# Patient Record
Sex: MALE | Race: WHITE | NOT HISPANIC OR LATINO | Employment: FULL TIME | URBAN - METROPOLITAN AREA
[De-identification: names, ages, dates, MRNs, and addresses within clinical notes are randomized per-mention and may not be internally consistent; named-entity substitution may affect disease eponyms.]

---

## 2017-08-23 ENCOUNTER — HOSPITAL ENCOUNTER (OUTPATIENT)
Facility: MEDICAL CENTER | Age: 42
End: 2017-08-23
Attending: FAMILY MEDICINE
Payer: COMMERCIAL

## 2017-08-23 ENCOUNTER — OFFICE VISIT (OUTPATIENT)
Dept: URGENT CARE | Facility: PHYSICIAN GROUP | Age: 42
End: 2017-08-23
Payer: COMMERCIAL

## 2017-08-23 VITALS
SYSTOLIC BLOOD PRESSURE: 112 MMHG | TEMPERATURE: 99 F | RESPIRATION RATE: 14 BRPM | HEART RATE: 68 BPM | HEIGHT: 72 IN | WEIGHT: 269 LBS | OXYGEN SATURATION: 98 % | DIASTOLIC BLOOD PRESSURE: 82 MMHG | BODY MASS INDEX: 36.44 KG/M2

## 2017-08-23 DIAGNOSIS — E66.9 OBESITY (BMI 35.0-39.9 WITHOUT COMORBIDITY): ICD-10-CM

## 2017-08-23 DIAGNOSIS — M79.672 LEFT FOOT PAIN: ICD-10-CM

## 2017-08-23 LAB
ALBUMIN SERPL BCP-MCNC: 4.1 G/DL (ref 3.2–4.9)
ALBUMIN/GLOB SERPL: 1.2 G/DL
ALP SERPL-CCNC: 65 U/L (ref 30–99)
ALT SERPL-CCNC: 22 U/L (ref 2–50)
ANION GAP SERPL CALC-SCNC: 8 MMOL/L (ref 0–11.9)
AST SERPL-CCNC: 21 U/L (ref 12–45)
BILIRUB SERPL-MCNC: 0.8 MG/DL (ref 0.1–1.5)
BUN SERPL-MCNC: 13 MG/DL (ref 8–22)
CALCIUM SERPL-MCNC: 9.5 MG/DL (ref 8.5–10.5)
CHLORIDE SERPL-SCNC: 104 MMOL/L (ref 96–112)
CO2 SERPL-SCNC: 26 MMOL/L (ref 20–33)
CREAT SERPL-MCNC: 1.04 MG/DL (ref 0.5–1.4)
GFR SERPL CREATININE-BSD FRML MDRD: >60 ML/MIN/1.73 M 2
GLOBULIN SER CALC-MCNC: 3.4 G/DL (ref 1.9–3.5)
GLUCOSE SERPL-MCNC: 91 MG/DL (ref 65–99)
POTASSIUM SERPL-SCNC: 4.3 MMOL/L (ref 3.6–5.5)
PROT SERPL-MCNC: 7.5 G/DL (ref 6–8.2)
SODIUM SERPL-SCNC: 138 MMOL/L (ref 135–145)
URATE SERPL-MCNC: 6.9 MG/DL (ref 2.5–8.3)

## 2017-08-23 PROCEDURE — 36415 COLL VENOUS BLD VENIPUNCTURE: CPT | Performed by: FAMILY MEDICINE

## 2017-08-23 PROCEDURE — 99214 OFFICE O/P EST MOD 30 MIN: CPT | Performed by: FAMILY MEDICINE

## 2017-08-23 PROCEDURE — 80053 COMPREHEN METABOLIC PANEL: CPT

## 2017-08-23 PROCEDURE — 84550 ASSAY OF BLOOD/URIC ACID: CPT

## 2017-08-23 RX ORDER — PREDNISONE 20 MG/1
TABLET ORAL
Qty: 9 TAB | Refills: 0 | Status: SHIPPED | OUTPATIENT
Start: 2017-08-23 | End: 2019-01-08

## 2017-08-23 NOTE — PROGRESS NOTES
Chief Complaint:    Chief Complaint   Patient presents with   • Foot Problem     BL foot pain x 1 month       History of Present Illness:    This is a new problem. Main problem is persisting discomfort in left 1st MTP joint region. This started 10 days ago for no particular reason. Pain was severe at one time, 10/10 severity. Now pain is 2/10 severity but is persisting despite using Ibuprofen which may have helped some. Wonders if could be gout. Does not have any history of gout. Also wonders about Diabetes. Before this problem, he had stubbed his right 1st toe, but now this feels back to normal and he is not concerned about right foot anymore.      Review of Systems:    Constitutional: Negative for fever, chills, and diaphoresis.   Eyes: Negative for change in vision, photophobia, pain, redness, and discharge.  ENT: Negative for ear pain, ear discharge, hearing loss, tinnitus, nasal congestion, nosebleeds, and sore throat.    Respiratory: Negative for cough, hemoptysis, sputum production, shortness of breath, wheezing, and stridor.    Cardiovascular: Negative for chest pain, palpitations, orthopnea, claudication, leg swelling, and PND.   Gastrointestinal: Negative for abdominal pain, nausea, vomiting, diarrhea, constipation, blood in stool, and melena.   Genitourinary: Negative for dysuria, urinary urgency, urinary frequency, hematuria, and flank pain.   Musculoskeletal: See HPI.  Skin: Negative for rash and itching.   Neurological: Negative for dizziness, tingling, tremors, sensory change, speech change, focal weakness, seizures, loss of consciousness, and headaches.   Endo: Negative for polydipsia.   Heme: Does not bruise/bleed easily.   Psychiatric/Behavioral: Negative for depression, suicidal ideas, hallucinations, memory loss and substance abuse. The patient is not nervous/anxious and does not have insomnia.      Past Medical History:    Past Medical History   Diagnosis Date   • Allergy        Past Surgical  "History:    History reviewed. No pertinent past surgical history.    Social History:    Social History     Social History   • Marital Status:      Spouse Name: N/A   • Number of Children: N/A   • Years of Education: N/A     Social History Main Topics   • Smoking status: Never Smoker    • Smokeless tobacco: Current User     Types: Chew   • Alcohol Use: No   • Drug Use: No   • Sexual Activity:     Partners: Female     Other Topics Concern   • None     Social History Narrative       Family History:    History reviewed. No pertinent family history.    Medications:    No current outpatient prescriptions on file prior to visit.     No current facility-administered medications on file prior to visit.       Allergies:    Allergies   Allergen Reactions   • Pcn [Penicillins]      Child kebede       Vitals:    Filed Vitals:    08/23/17 1026   BP: 112/82   Pulse: 68   Temp: 37.2 °C (99 °F)   Resp: 14   Height: 1.829 m (6' 0.01\")   Weight: 122.018 kg (269 lb)   SpO2: 98%       Physical Exam:    Constitutional: Vital signs reviewed. Appears well-developed and well-nourished. No acute distress.   Eyes: Sclera white, conjunctivae clear.  ENT: External ears normal. Hearing normal.  Cardiovascular: Peripheral pulses 2+.   Pulmonary/Chest: Respirations non-labored.  Musculoskeletal: Left 1st MTP joint region: mild erythema and soft tissue swelling with mild tenderness to palpation. No tenderness to palpation right foot.   Neurological: Alert and oriented to person, place, and time. Muscle tone normal. Coordination normal. Light touch and sensation normal.  Skin: No rashes or lesions. Warm, dry, normal turgor.  Psychiatric: Normal mood and affect. Behavior is normal. Judgment and thought content normal.     Diagnostics:     COMP METABOLIC PANEL (Order #[id number]) on 8/23/17       Component Results      Component Value Ref Range & Units Status     Sodium 138 135 - 145 mmol/L Final     Potassium 4.3 3.6 - 5.5 mmol/L Final     " Chloride 104 96 - 112 mmol/L Final     Co2 26 20 - 33 mmol/L Final     Anion Gap 8.0 0.0 - 11.9 Final     Glucose 91 65 - 99 mg/dL Final     Bun 13 8 - 22 mg/dL Final     Creatinine 1.04 0.50 - 1.40 mg/dL Final     Calcium 9.5 8.5 - 10.5 mg/dL Final     AST(SGOT) 21 12 - 45 U/L Final     ALT(SGPT) 22 2 - 50 U/L Final     Alkaline Phosphatase 65 30 - 99 U/L Final     Total Bilirubin 0.8 0.1 - 1.5 mg/dL Final     Albumin 4.1 3.2 - 4.9 g/dL Final     Total Protein 7.5 6.0 - 8.2 g/dL Final     Globulin 3.4 1.9 - 3.5 g/dL Final     A-G Ratio 1.2 g/dL Final      ESTIMATED GFR (Order #[id number]) on 8/23/17            ESTIMATED GFR (Order [id number]) - Reflex for Order [id number]         Component Results      Component Value Ref Range & Units Status     GFR If African American >60 >60 mL/min/1.73 m 2 Final     GFR If Non African American >60 >60 mL/min/1.73 m 2 Final     URIC ACID (Order #[id number]) on 8/23/17            URIC ACID (Order [id number])         Component Results      Component Value Ref Range & Units Status     Uric Acid 6.9 2.5 - 8.3 mg/dL Final       Assessment / Plan:    1. Left foot pain  - URIC ACID; Future  - predniSONE (DELTASONE) 20 MG Tab; 2 TABS ONCE A DAY ON DAYS 1-3, 1 TAB ONCE A DAY ON DAYS 4-6. TAKE WITH FOOD.  Dispense: 9 Tab; Refill: 0  - COMP METABOLIC PANEL; Future    2. Obesity (BMI 35.0-39.9 without comorbidity) ([facility])  - Patient identified as having weight management issue.  Appropriate orders and counseling given.      Discussed with him DDX and management options.    Agreeable to medication prescribed for anti-inflammatory effect and check labs ordered.    Spoke to him and discussed lab results (normal CMP and uric acid).    Follow-up with PCP or urgent care if getting worse or not better with above.

## 2017-08-23 NOTE — MR AVS SNAPSHOT
"        Raffaele Hall   2017 10:15 AM   Office Visit   MRN: [id number]    Department:  [facility]   Dept Phone:  [phone]    Description:  Male : 1975   Provider:  Alban Barry M.D.           Reason for Visit     Foot Problem BL foot pain x 1 month      Allergies as of 2017     Allergen Noted Reactions    Pcn [Penicillins] 10/23/2009       Child kebede      You were diagnosed with     Left foot pain   [[id number]]         Vital Signs     Blood Pressure Pulse Temperature Respirations Height Weight    112/82 mmHg 68 37.2 °C (99 °F) 14 1.829 m (6' 0.01\") 122.018 kg (269 lb)    Body Mass Index Oxygen Saturation Smoking Status             36.48 kg/m2 98% Never Smoker          Basic Information     Date Of Birth Sex Race Ethnicity Preferred Language    1975 Male White Non- English      Health Maintenance        Date Due Completion Dates    IMM INFLUENZA (1) 2017 ---    IMM DTaP/Tdap/Td Vaccine (2 - Td) 10/23/2019 10/23/2009            Current Immunizations     Tdap Vaccine 10/23/2009      Below and/or attached are the medications your provider expects you to take. Review all of your home medications and newly ordered medications with your provider and/or pharmacist. Follow medication instructions as directed by your provider and/or pharmacist. Please keep your medication list with you and share with your provider. Update the information when medications are discontinued, doses are changed, or new medications (including over-the-counter products) are added; and carry medication information at all times in the event of emergency situations     Allergies:  PCN - (reactions not documented)               Medications  Valid as of: 2017 - 10:55 AM    Generic Name Brand Name Tablet Size Instructions for use    PredniSONE (Tab) DELTASONE 20 MG 2 TABS ONCE A DAY ON DAYS 1-3, 1 TAB ONCE A DAY ON DAYS 4-6. TAKE WITH FOOD.        .                 Medicines prescribed today were " sent to:     [facility] [address] [place], [place] - [address] AT [facility] [address]    [address] [place] [address]    Phone: [phone] Fax: [phone]    Open 24 Hours?: No      Medication refill instructions:       If your prescription bottle indicates you have medication refills left, it is not necessary to call your provider’s office. Please contact your pharmacy and they will refill your medication.    If your prescription bottle indicates you do not have any refills left, you may request refills at any time through one of the following ways: The online [employer] system (except Urgent Care), by calling your provider’s office, or by asking your pharmacy to contact your provider’s office with a refill request. Medication refills are processed only during regular business hours and may not be available until the next business day. Your provider may request additional information or to have a follow-up visit with you prior to refilling your medication.   *Please Note: Medication refills are assigned a new Rx number when refilled electronically. Your pharmacy may indicate that no refills were authorized even though a new prescription for the same medication is available at the pharmacy. Please request the medicine by name with the pharmacy before contacting your provider for a refill.        Your To Do List     Future Labs/Procedures Complete By Expires    COMP METABOLIC PANEL  As directed 8/30/2017    URIC ACID  As directed 8/30/2017         [employer] Status: Patient Declined        Quit Tobacco Information     Do you want to quit using tobacco?    Quitting tobacco decreases risks of cancer, heart and lung disease, increases life expectancy, improves sense of taste and smell, and increases spending money, among other benefits.    If you are thinking about quitting, we can help.  • [facility] Quit Tobacco Program: 167-661-9710  o Program occurs weekly for four weeks and includes  pharmacist consultation on products to support quitting smoking or chewing tobacco. A provider referral is needed for pharmacist consultation.  • Tobacco Users Help Hotline: 5-800QUIT-NOW (039-5627) or https://[place].quitlogix.org/  o Free, confidential telephone and online coaching for [place] residents. Sessions are designed on a schedule that is convenient for you. Eligible clients receive free nicotine replacement therapy.  • Nationally: www.smokefree.gov  o Information and professional assistance to support both immediate and long-term needs as you become, and remain, a non-smoker. Smokefree.gov allows you to choose the help that best fits your needs.

## 2017-11-03 ENCOUNTER — HOSPITAL ENCOUNTER (OUTPATIENT)
Dept: RADIOLOGY | Facility: MEDICAL CENTER | Age: 42
End: 2017-11-03
Attending: FAMILY MEDICINE
Payer: COMMERCIAL

## 2017-11-03 ENCOUNTER — OFFICE VISIT (OUTPATIENT)
Dept: URGENT CARE | Facility: PHYSICIAN GROUP | Age: 42
End: 2017-11-03
Payer: COMMERCIAL

## 2017-11-03 VITALS
HEIGHT: 72 IN | WEIGHT: 265 LBS | RESPIRATION RATE: 16 BRPM | TEMPERATURE: 97.9 F | DIASTOLIC BLOOD PRESSURE: 68 MMHG | OXYGEN SATURATION: 95 % | HEART RATE: 76 BPM | BODY MASS INDEX: 35.89 KG/M2 | SYSTOLIC BLOOD PRESSURE: 126 MMHG

## 2017-11-03 DIAGNOSIS — M79.672 LEFT FOOT PAIN: ICD-10-CM

## 2017-11-03 DIAGNOSIS — M19.90 INFLAMMATORY ARTHRITIS: ICD-10-CM

## 2017-11-03 PROCEDURE — 73630 X-RAY EXAM OF FOOT: CPT | Mod: LT

## 2017-11-03 PROCEDURE — 99214 OFFICE O/P EST MOD 30 MIN: CPT | Performed by: FAMILY MEDICINE

## 2017-11-03 RX ORDER — INDOMETHACIN 50 MG/1
50 CAPSULE ORAL 3 TIMES DAILY PRN
Qty: 30 CAP | Refills: 1 | Status: SHIPPED | OUTPATIENT
Start: 2017-11-03 | End: 2019-01-08

## 2017-11-03 ASSESSMENT — ENCOUNTER SYMPTOMS
MYALGIAS: 0
WEIGHT LOSS: 0

## 2017-11-03 NOTE — PROGRESS NOTES
Subjective:      Raffaele Hall is a 42 y.o. male who presents with Foot Pain (bilateral )            6 weeks intermittent foot pain. No trauma. Suspects gout but uric acid level was normal. Pain is again severe L>R. He has been trying to pay attention to diet and thinks fish might trigger. No fever.         Pair is worse with weight bearing. No other aggravating or alleviating factors.      Review of Systems   Constitutional: Negative for malaise/fatigue and weight loss.   Musculoskeletal: Negative for joint pain and myalgias.   Skin: Negative for itching and rash.     .  Medications, Allergies, and current problem list reviewed today in Epic       Objective:     /68   Pulse 76   Temp 36.6 °C (97.9 °F)   Resp 16   Ht 1.829 m (6')   Wt 120.2 kg (265 lb)   SpO2 95%   BMI 35.94 kg/m²      Physical Exam   Constitutional: He appears well-developed and well-nourished. No distress.   Musculoskeletal:        Feet:    Left foot:    Neurological:   Speech is clear. Patient is appropriate and cooperative.     Skin: Skin is warm and dry. No rash noted.               Assessment/Plan:   Xray: no fracture or dislocation by my read. Radiology review pending.    1. Left foot pain  DX-FOOT-COMPLETE 3+ LEFT    REFERRAL TO PODIATRY    indomethacin (INDOCIN) 50 MG Cap   2. Inflammatory arthritis       Differential diagnosis, natural history, supportive care, and indications for immediate follow-up discussed at length.   Suspect gout

## 2019-01-03 ENCOUNTER — HOSPITAL ENCOUNTER (OUTPATIENT)
Dept: RADIOLOGY | Facility: MEDICAL CENTER | Age: 44
End: 2019-01-03
Attending: PHYSICIAN ASSISTANT
Payer: COMMERCIAL

## 2019-01-03 ENCOUNTER — OFFICE VISIT (OUTPATIENT)
Dept: URGENT CARE | Facility: PHYSICIAN GROUP | Age: 44
End: 2019-01-03
Payer: COMMERCIAL

## 2019-01-03 VITALS
BODY MASS INDEX: 34.58 KG/M2 | SYSTOLIC BLOOD PRESSURE: 120 MMHG | RESPIRATION RATE: 20 BRPM | WEIGHT: 255 LBS | OXYGEN SATURATION: 95 % | DIASTOLIC BLOOD PRESSURE: 82 MMHG | TEMPERATURE: 97 F | HEART RATE: 110 BPM

## 2019-01-03 DIAGNOSIS — R05.9 COUGH: ICD-10-CM

## 2019-01-03 DIAGNOSIS — J98.8 RESPIRATORY INFECTION: ICD-10-CM

## 2019-01-03 PROCEDURE — 94640 AIRWAY INHALATION TREATMENT: CPT | Performed by: PHYSICIAN ASSISTANT

## 2019-01-03 PROCEDURE — 94760 N-INVAS EAR/PLS OXIMETRY 1: CPT | Performed by: PHYSICIAN ASSISTANT

## 2019-01-03 PROCEDURE — 99214 OFFICE O/P EST MOD 30 MIN: CPT | Mod: 25 | Performed by: PHYSICIAN ASSISTANT

## 2019-01-03 PROCEDURE — 71046 X-RAY EXAM CHEST 2 VIEWS: CPT

## 2019-01-03 RX ORDER — BENZONATATE 100 MG/1
200 CAPSULE ORAL 3 TIMES DAILY PRN
Qty: 30 CAP | Refills: 0 | Status: SHIPPED | OUTPATIENT
Start: 2019-01-03 | End: 2019-01-08

## 2019-01-03 RX ORDER — ALBUTEROL SULFATE 2.5 MG/3ML
2.5 SOLUTION RESPIRATORY (INHALATION) EVERY 6 HOURS PRN
Qty: 30 BULLET | Refills: 0 | Status: SHIPPED | OUTPATIENT
Start: 2019-01-03 | End: 2022-05-03 | Stop reason: SDUPTHER

## 2019-01-03 RX ORDER — ALBUTEROL SULFATE 2.5 MG/3ML
2.5 SOLUTION RESPIRATORY (INHALATION) ONCE
Status: COMPLETED | OUTPATIENT
Start: 2019-01-03 | End: 2019-01-03

## 2019-01-03 RX ORDER — AZITHROMYCIN 250 MG/1
TABLET, FILM COATED ORAL
Qty: 6 TAB | Refills: 0 | Status: SHIPPED | OUTPATIENT
Start: 2019-01-03 | End: 2019-01-08

## 2019-01-03 RX ADMIN — ALBUTEROL SULFATE 2.5 MG: 2.5 SOLUTION RESPIRATORY (INHALATION) at 11:38

## 2019-01-03 ASSESSMENT — ENCOUNTER SYMPTOMS
SORE THROAT: 1
COUGH: 1
DIARRHEA: 0
EYE DISCHARGE: 0
SPUTUM PRODUCTION: 1
RHINORRHEA: 1
HEADACHES: 0
CHILLS: 1
FEVER: 0
WHEEZING: 1
TINGLING: 0
DIZZINESS: 0
VOMITING: 0
SHORTNESS OF BREATH: 1
NECK PAIN: 0
EYE REDNESS: 0
MYALGIAS: 1

## 2019-01-04 NOTE — PROGRESS NOTES
Subjective:      Raffaele Hall is a 43 y.o. male who presents with Cough (x9 days, low energy, chest congestion, HA)            Cough   This is a new problem. Episode onset: 9-10 days ago. The problem has been waxing and waning. The problem occurs every few minutes. The cough is productive of sputum. Associated symptoms include chills, myalgias, nasal congestion, postnasal drip, rhinorrhea, a sore throat, shortness of breath and wheezing. Pertinent negatives include no chest pain, ear congestion, ear pain, eye redness, fever, headaches or rash. Associated symptoms comments: Fever- resolved.   . Nothing aggravates the symptoms. He has tried OTC cough suppressant for the symptoms. The treatment provided mild relief. His past medical history is significant for bronchitis and pneumonia. There is no history of asthma.       Review of Systems   Constitutional: Positive for chills and malaise/fatigue. Negative for fever.   HENT: Positive for congestion, postnasal drip, rhinorrhea and sore throat. Negative for ear discharge and ear pain.    Eyes: Negative for discharge and redness.   Respiratory: Positive for cough, sputum production, shortness of breath and wheezing.    Cardiovascular: Negative for chest pain.   Gastrointestinal: Negative for diarrhea and vomiting.   Genitourinary: Negative for dysuria and urgency.   Musculoskeletal: Positive for myalgias. Negative for neck pain.   Skin: Negative for itching and rash.   Neurological: Negative for dizziness, tingling and headaches.   All other systems reviewed and are negative.         Objective:     /82   Pulse (!) 110   Temp 36.1 °C (97 °F)   Resp 20   Wt 115.7 kg (255 lb)   SpO2 95%   BMI 34.58 kg/m²    PMH:  has a past medical history of Allergy.  MEDS:   Current Outpatient Prescriptions:   •  albuterol (PROVENTIL) 2.5mg/3ml Nebu Soln solution for nebulization, 3 mL by Nebulization route every 6 hours as needed for Shortness of Breath., Disp: 30 Bullet, Rfl:  0  •  azithromycin (ZITHROMAX) 250 MG Tab, Take 2 tabs today, then 1 tab daily til completed., Disp: 6 Tab, Rfl: 0  •  benzonatate (TESSALON) 100 MG Cap, Take 2 Caps by mouth 3 times a day as needed for Cough., Disp: 30 Cap, Rfl: 0  •  indomethacin (INDOCIN) 50 MG Cap, Take 1 Cap by mouth 3 times a day as needed. (Patient not taking: Reported on 1/3/2019), Disp: 30 Cap, Rfl: 1  •  predniSONE (DELTASONE) 20 MG Tab, 2 TABS ONCE A DAY ON DAYS 1-3, 1 TAB ONCE A DAY ON DAYS 4-6. TAKE WITH FOOD. (Patient not taking: Reported on 1/3/2019), Disp: 9 Tab, Rfl: 0  ALLERGIES:   Allergies   Allergen Reactions   • Pcn [Penicillins]      Child kebede     SURGHX: No past surgical history on file.  SOCHX:  reports that he has never smoked. His smokeless tobacco use includes Chew. He reports that he does not drink alcohol or use drugs.  FH: Family history was reviewed, no pertinent findings to report    Physical Exam   Constitutional: He is oriented to person, place, and time. He appears well-developed and well-nourished.   HENT:   Head: Normocephalic and atraumatic.   Mouth/Throat: No oropharyngeal exudate.   Ears- Canals clear- TM- with clear fluid effusions bilaterally.   Pos. PND, with slight erythema- without tonsillar edema or exudate.   Mild discharge noted bilaterally- to nares.      Eyes: Pupils are equal, round, and reactive to light. EOM are normal.   Neck: Normal range of motion. Neck supple.   Cardiovascular: Normal rate and regular rhythm.    No murmur heard.  Pulmonary/Chest: Effort normal. No respiratory distress. He has wheezes.   Musculoskeletal: Normal range of motion. He exhibits no tenderness.   Lymphadenopathy:     He has no cervical adenopathy.   Neurological: He is alert and oriented to person, place, and time.   Skin: Skin is warm. No rash noted.   Psychiatric: He has a normal mood and affect. His behavior is normal.   Vitals reviewed.              Assessment/Plan:     1. Respiratory infection  - albuterol  (PROVENTIL) 2.5mg/3ml Nebu Soln solution for nebulization; 3 mL by Nebulization route every 6 hours as needed for Shortness of Breath.  Dispense: 30 Bullet; Refill: 0  - azithromycin (ZITHROMAX) 250 MG Tab; Take 2 tabs today, then 1 tab daily til completed.  Dispense: 6 Tab; Refill: 0  - benzonatate (TESSALON) 100 MG Cap; Take 2 Caps by mouth 3 times a day as needed for Cough.  Dispense: 30 Cap; Refill: 0    2. Cough  - DX-CHEST-2 VIEWS; Future  - albuterol (PROVENTIL) 2.5mg/3ml nebulizer solution 2.5 mg; 3 mL by Nebulization route Once.  - albuterol (PROVENTIL) 2.5mg/3ml Nebu Soln solution for nebulization; 3 mL by Nebulization route every 6 hours as needed for Shortness of Breath.  Dispense: 30 Bullet; Refill: 0  - azithromycin (ZITHROMAX) 250 MG Tab; Take 2 tabs today, then 1 tab daily til completed.  Dispense: 6 Tab; Refill: 0  - benzonatate (TESSALON) 100 MG Cap; Take 2 Caps by mouth 3 times a day as needed for Cough.  Dispense: 30 Cap; Refill: 0    Noted subjective improvement after breathing treatment today-pulse ox rechecked by myself of which was 96%.  CXR: no acute cardiopulmonary process-reviewed the chest x-ray and agree with the official read.  Due to patient's history we will start the patient on above.  We will also provide refill on his albuterol for his home nebulizer.     Encouraged OTC supportive meds PRN. Humidification, increase fluids, avoid night time dairy.   Patient given precautionary s/sx that mandate immediate follow up and evaluation in the ED. Advised of risks of not doing so.    DDX, Supportive care, and indications for immediate follow-up discussed with patient.    Instructed to return to clinic or nearest emergency department if we are not available for any change in condition, further concerns, or worsening of symptoms.    The patient demonstrated a good understanding and agreed with the treatment plan.

## 2019-01-08 ENCOUNTER — OFFICE VISIT (OUTPATIENT)
Dept: URGENT CARE | Facility: PHYSICIAN GROUP | Age: 44
End: 2019-01-08
Payer: COMMERCIAL

## 2019-01-08 VITALS
WEIGHT: 263 LBS | RESPIRATION RATE: 20 BRPM | DIASTOLIC BLOOD PRESSURE: 82 MMHG | HEART RATE: 122 BPM | OXYGEN SATURATION: 97 % | SYSTOLIC BLOOD PRESSURE: 126 MMHG | TEMPERATURE: 98 F | BODY MASS INDEX: 35.67 KG/M2

## 2019-01-08 DIAGNOSIS — J98.01 BRONCHOSPASM: Primary | ICD-10-CM

## 2019-01-08 DIAGNOSIS — J01.10 ACUTE NON-RECURRENT FRONTAL SINUSITIS: ICD-10-CM

## 2019-01-08 LAB
FLUAV+FLUBV AG SPEC QL IA: NORMAL
INT CON NEG: NEGATIVE
INT CON POS: POSITIVE

## 2019-01-08 PROCEDURE — 99214 OFFICE O/P EST MOD 30 MIN: CPT | Performed by: PHYSICIAN ASSISTANT

## 2019-01-08 PROCEDURE — 87804 INFLUENZA ASSAY W/OPTIC: CPT | Performed by: PHYSICIAN ASSISTANT

## 2019-01-08 RX ORDER — METHYLPREDNISOLONE 4 MG/1
4 TABLET ORAL DAILY
Qty: 1 KIT | Refills: 0 | Status: SHIPPED | OUTPATIENT
Start: 2019-01-08 | End: 2019-01-14

## 2019-01-08 RX ORDER — FLUTICASONE PROPIONATE 50 MCG
1 SPRAY, SUSPENSION (ML) NASAL DAILY
Qty: 16 G | Refills: 0 | Status: SHIPPED | OUTPATIENT
Start: 2019-01-08 | End: 2019-01-15

## 2019-01-08 ASSESSMENT — ENCOUNTER SYMPTOMS
SORE THROAT: 0
CHILLS: 0
MYALGIAS: 1
COUGH: 1
SHORTNESS OF BREATH: 0
VERTIGO: 1
NAUSEA: 1
CHANGE IN BOWEL HABIT: 0
CONSTIPATION: 0
VOMITING: 0
ABDOMINAL PAIN: 0
FEVER: 0
FATIGUE: 1
WHEEZING: 0
SINUS PAIN: 0
DIARRHEA: 0

## 2019-01-08 NOTE — LETTER
January 8, 2019         Patient: Raffaele Hall   YOB: 1975   Date of Visit: 1/8/2019           To Whom it May Concern:    Raffaele Hall was seen in my clinic on 1/8/2019. He may return to work on 1/10/18.    If you have any questions or concerns, please don't hesitate to call.        Sincerely,           Charlene Beltran P.A.-C.  Electronically Signed

## 2019-01-08 NOTE — PATIENT INSTRUCTIONS
Bronchospasm, Adult  A bronchospasm is when the tubes that carry air in and out of your lungs (airways) spasm or tighten. During a bronchospasm it is hard to breathe. This is because the airways get smaller. A bronchospasm can be triggered by:  · Allergies. These may be to animals, pollen, food, or mold.  · Infection. This is a common cause of bronchospasm.  · Exercise.  · Irritants. These include pollution, cigarette smoke, strong odors, aerosol sprays, and paint fumes.  · Weather changes.  · Stress.  · Being emotional.  Follow these instructions at home:  · Always have a plan for getting help. Know when to call your doctor and local emergency services (911 in the [place] Know where you can get emergency care.  · Only take medicines as told by your doctor.  · If you were prescribed an inhaler or nebulizer machine, ask your doctor how to use it correctly. Always use a spacer with your inhaler if you were given one.  · Stay calm during an attack. Try to relax and breathe more slowly.  · Control your home environment:  ¨ Change your heating and air conditioning filter at least once a month.  ¨ Limit your use of fireplaces and wood stoves.  ¨ Do not  smoke. Do not  allow smoking in your home.  ¨ Avoid perfumes and fragrances.  ¨ Get rid of pests (such as roaches and mice) and their droppings.  ¨ Throw away plants if you see mold on them.  ¨ Keep your house clean and dust free.  ¨ Replace carpet with wood, tile, or vinyl bryanna. Carpet can trap dander and dust.  ¨ Use allergy-proof pillows, mattress covers, and box spring covers.  ¨ Wash bed sheets and blankets every week in hot water. Dry them in a dryer.  ¨ Use blankets that are made of polyester or cotton.  ¨ Wash hands frequently.  Contact a doctor if:  · You have muscle aches.  · You have chest pain.  · The thick spit you spit or cough up (sputum) changes from clear or white to yellow, green, gray, or bloody.  · The thick spit you spit or cough up gets  thicker.  · There are problems that may be related to the medicine you are given such as:  ¨ A rash.  ¨ Itching.  ¨ Swelling.  ¨ Trouble breathing.  Get help right away if:  · You feel you cannot breathe or catch your breath.  · You cannot stop coughing.  · Your treatment is not helping you breathe better.  · You have very bad chest pain.  This information is not intended to replace advice given to you by your health care provider. Make sure you discuss any questions you have with your health care provider.  Document Released: 10/15/2010 Document Revised: 05/25/2017 Document Reviewed: 06/10/2014  [employer] Interactive Patient Education © 2017 [employer]

## 2019-01-08 NOTE — PROGRESS NOTES
Subjective:   Raffaele Hall is a 43 y.o. male who presents for Cough (x1 week, sinuses seemed to be draining into ears and makes pt dizzy )        Ear Fullness   This is a new problem. Episode onset: 4 days. The problem occurs constantly. The problem has been unchanged. Associated symptoms include coughing, fatigue, myalgias, nausea and vertigo. Pertinent negatives include no abdominal pain, change in bowel habit, chills, congestion, fever, sore throat or vomiting. Nothing aggravates the symptoms. Treatments tried: Robitussin.     Review of Systems   Constitutional: Positive for fatigue. Negative for chills, fever and malaise/fatigue.   HENT: Negative for congestion, sinus pain and sore throat.    Respiratory: Positive for cough. Negative for shortness of breath and wheezing.    Gastrointestinal: Positive for nausea. Negative for abdominal pain, change in bowel habit, constipation, diarrhea and vomiting.   Musculoskeletal: Positive for myalgias.   Neurological: Positive for vertigo.   All other systems reviewed and are negative.      PMH:  has a past medical history of Allergy.  MEDS:   Current Outpatient Prescriptions:   •  MethylPREDNISolone (MEDROL DOSEPAK) 4 MG Tablet Therapy Pack, Take 1 Tab by mouth every day for 6 days. Take as tapered-dosage schedule, Disp: 1 Kit, Rfl: 0  •  fluticasone (FLONASE) 50 MCG/ACT nasal spray, Spray 1 Spray in nose every day for 7 days., Disp: 16 g, Rfl: 0  •  albuterol (PROVENTIL) 2.5mg/3ml Nebu Soln solution for nebulization, 3 mL by Nebulization route every 6 hours as needed for Shortness of Breath., Disp: 30 Bullet, Rfl: 0  ALLERGIES:   Allergies   Allergen Reactions   • Pcn [Penicillins]      Child kebede     SURGHX: History reviewed. No pertinent surgical history.  SOCHX:  reports that he has never smoked. His smokeless tobacco use includes Chew. He reports that he does not drink alcohol or use drugs.  History reviewed. No pertinent family history.     Objective:   /82    Pulse (!) 122   Temp 36.7 °C (98 °F)   Resp 20   Wt 119.3 kg (263 lb)   SpO2 97%   BMI 35.67 kg/m²     Physical Exam   Constitutional: He is oriented to person, place, and time. He appears well-developed and well-nourished. No distress.   HENT:   Head: Normocephalic and atraumatic.   Right Ear: Hearing, tympanic membrane and ear canal normal.   Left Ear: Hearing, tympanic membrane and ear canal normal.   Nose: Mucosal edema and rhinorrhea present. No sinus tenderness.   Mouth/Throat: Posterior oropharyngeal erythema present. No oropharyngeal exudate or posterior oropharyngeal edema.   Eyes: Pupils are equal, round, and reactive to light. Conjunctivae are normal.   Neck: Normal range of motion. Neck supple.   Cardiovascular: Normal rate and regular rhythm.    Pulmonary/Chest: Effort normal and breath sounds normal.   Lymphadenopathy:     He has cervical adenopathy.   Neurological: He is alert and oriented to person, place, and time.   Skin: Skin is warm and dry.   Psychiatric: He has a normal mood and affect. His behavior is normal.   Vitals reviewed.    Influenza A/B: Negative      Assessment/Plan:     1. Bronchospasm  MethylPREDNISolone (MEDROL DOSEPAK) 4 MG Tablet Therapy Pack    POCT Influenza A/B   2. Acute non-recurrent frontal sinusitis  fluticasone (FLONASE) 50 MCG/ACT nasal spray     Supportive care reviewed.  Educational handout on bronchospasm provided    We discussed importance of follow-up with primary care provider within 7-10 days.  If symptoms worsen or persist patient can contact me or return to clinic for follow-up.  Red flags and strict emergency room precautions discussed.  Patient verbalizes understanding of information.

## 2021-08-02 ENCOUNTER — NON-PROVIDER VISIT (OUTPATIENT)
Dept: URGENT CARE | Facility: PHYSICIAN GROUP | Age: 46
End: 2021-08-02

## 2021-08-02 DIAGNOSIS — Z02.1 PRE-EMPLOYMENT DRUG SCREENING: ICD-10-CM

## 2021-08-02 LAB
AMP AMPHETAMINE: NORMAL
COC COCAINE: NORMAL
INT CON NEG: NORMAL
INT CON POS: NORMAL
MET METHAMPHETAMINES: NORMAL
OPI OPIATES: NORMAL
PCP PHENCYCLIDINE: NORMAL
POC DRUG COMMENT 753798-OCCUPATIONAL HEALTH: NEGATIVE
THC: NORMAL

## 2021-08-02 PROCEDURE — 80305 DRUG TEST PRSMV DIR OPT OBS: CPT | Performed by: PHYSICIAN ASSISTANT

## 2022-04-07 ENCOUNTER — TELEPHONE (OUTPATIENT)
Dept: SCHEDULING | Facility: IMAGING CENTER | Age: 47
End: 2022-04-07
Payer: COMMERCIAL

## 2022-05-03 ENCOUNTER — RESEARCH ENCOUNTER (OUTPATIENT)
Dept: MEDICAL GROUP | Facility: PHYSICIAN GROUP | Age: 47
End: 2022-05-03

## 2022-05-03 ENCOUNTER — HOSPITAL ENCOUNTER (OUTPATIENT)
Dept: LAB | Facility: MEDICAL CENTER | Age: 47
End: 2022-05-03
Attending: PHYSICIAN ASSISTANT
Payer: COMMERCIAL

## 2022-05-03 ENCOUNTER — OFFICE VISIT (OUTPATIENT)
Dept: MEDICAL GROUP | Facility: PHYSICIAN GROUP | Age: 47
End: 2022-05-03
Payer: COMMERCIAL

## 2022-05-03 VITALS
RESPIRATION RATE: 18 BRPM | HEART RATE: 99 BPM | BODY MASS INDEX: 36.7 KG/M2 | OXYGEN SATURATION: 96 % | SYSTOLIC BLOOD PRESSURE: 104 MMHG | TEMPERATURE: 97.3 F | DIASTOLIC BLOOD PRESSURE: 78 MMHG | WEIGHT: 271 LBS | HEIGHT: 72 IN

## 2022-05-03 DIAGNOSIS — Z00.6 RESEARCH STUDY PATIENT: ICD-10-CM

## 2022-05-03 DIAGNOSIS — Z13.71 ENCOUNTER FOR NONPROCREATIVE SCREENING FOR GENETIC DISEASE CARRIER STATUS: ICD-10-CM

## 2022-05-03 DIAGNOSIS — E66.9 OBESITY (BMI 35.0-39.9 WITHOUT COMORBIDITY): ICD-10-CM

## 2022-05-03 DIAGNOSIS — M79.675 PAIN OF TOE OF LEFT FOOT: ICD-10-CM

## 2022-05-03 DIAGNOSIS — G25.0 ESSENTIAL TREMOR: ICD-10-CM

## 2022-05-03 DIAGNOSIS — R06.83 SNORING: ICD-10-CM

## 2022-05-03 DIAGNOSIS — R05.9 COUGH: ICD-10-CM

## 2022-05-03 DIAGNOSIS — Z23 NEED FOR VACCINATION: ICD-10-CM

## 2022-05-03 PROBLEM — M21.41 FLAT FEET: Status: ACTIVE | Noted: 2022-05-03

## 2022-05-03 PROBLEM — M21.42 FLAT FEET: Status: ACTIVE | Noted: 2022-05-03

## 2022-05-03 PROBLEM — M79.676 TOE PAIN: Status: ACTIVE | Noted: 2022-05-03

## 2022-05-03 LAB
ALBUMIN SERPL BCP-MCNC: 4.4 G/DL (ref 3.2–4.9)
ALBUMIN/GLOB SERPL: 1.3 G/DL
ALP SERPL-CCNC: 79 U/L (ref 30–99)
ALT SERPL-CCNC: 20 U/L (ref 2–50)
ANION GAP SERPL CALC-SCNC: 9 MMOL/L (ref 7–16)
AST SERPL-CCNC: 20 U/L (ref 12–45)
BILIRUB SERPL-MCNC: 0.7 MG/DL (ref 0.1–1.5)
BUN SERPL-MCNC: 17 MG/DL (ref 8–22)
CALCIUM SERPL-MCNC: 9.5 MG/DL (ref 8.5–10.5)
CHLORIDE SERPL-SCNC: 104 MMOL/L (ref 96–112)
CHOLEST SERPL-MCNC: 195 MG/DL (ref 100–199)
CO2 SERPL-SCNC: 26 MMOL/L (ref 20–33)
CREAT SERPL-MCNC: 1.24 MG/DL (ref 0.5–1.4)
ERYTHROCYTE [DISTWIDTH] IN BLOOD BY AUTOMATED COUNT: 38.8 FL (ref 35.9–50)
EST. AVERAGE GLUCOSE BLD GHB EST-MCNC: 103 MG/DL
FASTING STATUS PATIENT QL REPORTED: NORMAL
GFR SERPLBLD CREATININE-BSD FMLA CKD-EPI: 72 ML/MIN/1.73 M 2
GLOBULIN SER CALC-MCNC: 3.3 G/DL (ref 1.9–3.5)
GLUCOSE SERPL-MCNC: 100 MG/DL (ref 65–99)
HBA1C MFR BLD: 5.2 % (ref 4–5.6)
HCT VFR BLD AUTO: 48.3 % (ref 42–52)
HDLC SERPL-MCNC: 38 MG/DL
HGB BLD-MCNC: 16.1 G/DL (ref 14–18)
LDLC SERPL CALC-MCNC: 144 MG/DL
MCH RBC QN AUTO: 29.1 PG (ref 27–33)
MCHC RBC AUTO-ENTMCNC: 33.3 G/DL (ref 33.7–35.3)
MCV RBC AUTO: 87.3 FL (ref 81.4–97.8)
PLATELET # BLD AUTO: 215 K/UL (ref 164–446)
PMV BLD AUTO: 10.2 FL (ref 9–12.9)
POTASSIUM SERPL-SCNC: 4.6 MMOL/L (ref 3.6–5.5)
PROT SERPL-MCNC: 7.7 G/DL (ref 6–8.2)
RBC # BLD AUTO: 5.53 M/UL (ref 4.7–6.1)
SODIUM SERPL-SCNC: 139 MMOL/L (ref 135–145)
TRIGL SERPL-MCNC: 67 MG/DL (ref 0–149)
TSH SERPL DL<=0.005 MIU/L-ACNC: 2.48 UIU/ML (ref 0.38–5.33)
WBC # BLD AUTO: 5.7 K/UL (ref 4.8–10.8)

## 2022-05-03 PROCEDURE — 90471 IMMUNIZATION ADMIN: CPT | Performed by: PHYSICIAN ASSISTANT

## 2022-05-03 PROCEDURE — 83036 HEMOGLOBIN GLYCOSYLATED A1C: CPT

## 2022-05-03 PROCEDURE — 80053 COMPREHEN METABOLIC PANEL: CPT

## 2022-05-03 PROCEDURE — 36415 COLL VENOUS BLD VENIPUNCTURE: CPT

## 2022-05-03 PROCEDURE — 85027 COMPLETE CBC AUTOMATED: CPT

## 2022-05-03 PROCEDURE — 99204 OFFICE O/P NEW MOD 45 MIN: CPT | Mod: 25 | Performed by: PHYSICIAN ASSISTANT

## 2022-05-03 PROCEDURE — 84443 ASSAY THYROID STIM HORMONE: CPT

## 2022-05-03 PROCEDURE — 90715 TDAP VACCINE 7 YRS/> IM: CPT | Performed by: PHYSICIAN ASSISTANT

## 2022-05-03 PROCEDURE — 80061 LIPID PANEL: CPT

## 2022-05-03 RX ORDER — ALBUTEROL SULFATE 2.5 MG/3ML
2.5 SOLUTION RESPIRATORY (INHALATION) EVERY 6 HOURS PRN
Qty: 30 EACH | Refills: 0 | Status: SHIPPED | OUTPATIENT
Start: 2022-05-03 | End: 2022-08-03

## 2022-05-03 ASSESSMENT — PATIENT HEALTH QUESTIONNAIRE - PHQ9: CLINICAL INTERPRETATION OF PHQ2 SCORE: 0

## 2022-05-03 NOTE — PATIENT INSTRUCTIONS
SLEEP STUDY INSTRUCTIONS    1. Our main concern is to provide the best test and evaluation of your sleep and your cooperation in following the guidelines is very necessary.    2. We have no facilities for family members or guests at the sleep center. Special arrangements will be made for children requiring overnight sleep studies.    3. Unless otherwise instructed, AVOID alcoholic beverages on the day of your sleep study.    4. DO NOT drink coffee or caffeine-containing beverages after 12:00 noon on the day of your sleep study.    5. There is NO smoking at the sleep center.    6. Try to maintain a usual daytime schedule prior to the study (avoid unusual physical activity or meals).    7. DO NOT take a nap on the day of your study.    8. This is an outpatient procedure (test); therefore, nursing services, medications, and meals ARE NOT provided. If you take medications, bring them with you and take them on the schedule you do at home.    9. Please fill your sleep aid prescription (Ambien or Lunesta) and bring to your sleep study. Even patients who normally have no problem going to sleep often need a sleep aid in this different environment.    10. We ask that you wear conventional sleep attire (pajamas or sweats) for the sleep study. We discourage patients from wearing only their underwear to bed. We recommend two-piece pajamas as the techs will need to apply sensors to your stomach.    11. Please shampoo your hair the day of the sleep study. Please DO NOT use any other hair or skin products before your arrival (e.g., mousse, gel, hair or body spray, perfume, body lotion etc.) NOTE: Women should not wear heavy makeup prior to arrival as some wires are taped to the face area.    12. The technician will be applying several small electrodes to the scalp, eye area, chin, chest, and legs, plus respiratory effort belts around the chest. Also, there will be a device placed directly under the nose. (THIS WILL NOT OBSTRUCT  YOUR BREATHING.) This is a painless procedure and the skin is not broken.    13. The test is generally completed in six to eight hours; We are usually done between 6 - 7 a.m., unless you are scheduled for a nap study. You may need to come back another night for a second study to complete your treatment plan.    14. Patients who are scheduled for an MSLT (nap study) will stay at the sleep center for the day following their nighttime study. You will be notified if a nap study was ordered for you at the time the night study is scheduled. Generally, patients having a nap study will leave the sleep center by 4 p.m.    15. You will need to bring food for the following day if you are scheduled for a nap study. A refrigerator and microwave are available.    16. A bathroom is available for your use.    17. We are able to adjust the room temperature for your comfort. Please let the technologist know if you are uncomfortable during the study.    18. If you sleep better with a special pillow or stuffed animal, you may bring it along. Service animals are the only live animals permitted.    19. Cable T.V. is available.    20. You will be scheduled for a follow-up appointment three to five days after the sleep study to review your results.    21. A copy of your sleep study is sent to the referring physician approximately two weeks after your study.    22. Any questions can be directed to our staff at [phone].    23. If CPAP therapy is applied, a home unit will be ordered for you through the [employer] company. You will be contacted to schedule delivery after insurance authorization.

## 2022-05-03 NOTE — PROGRESS NOTES
Subjective:     CC:  Diagnoses of Pain of toe of left foot, Snoring, Obesity (BMI 35.0-39.9 without comorbidity), Essential tremor, Cough, Need for vaccination, and Encounter for nonprocreative screening for genetic disease carrier status were pertinent to this visit.    HISTORY OF THE PRESENT ILLNESS: Patient is a 46 y.o. male. This pleasant patient is here today to establish care and discuss tremor, toe pain. His prior PCP was none. His past medical, surgical, social, and family history were reviewed in detail.    Toe pain  Patient reports that he was having an episode of dullness on his big toe on his left foot. This lasted for about 3 days and went away on its own. Was previously seen by podiatry and had insoles fitted and his pain has gone away. Uric acid was normal in the past.     Essential tremor  Patient has noticed an occasional tremor in his hands (right worse than left).  Patient only notices it when he is intentionally trying to move his hand.'s never has symptoms at rest.  His symptoms also improve when he drinks alcohol.  He does notice that when he is more anxious or stressed his symptoms seem to be worse.  No other neurological changes.    Obesity (BMI 35.0-39.9 without comorbidity) (HCC)  Patient lost 50 lbs in the past but recently gained 40 lbs back.       Allergies: Pcn [penicillins]    Current Outpatient Medications Ordered in Epic   Medication Sig Dispense Refill   • albuterol (PROVENTIL) 2.5mg/3ml Nebu Soln solution for nebulization Take 3 mL by nebulization every 6 hours as needed for Shortness of Breath. 30 Each 0     No current [facility]-ordered facility-administered medications on file.       Past Medical History:   Diagnosis Date   • Allergy        History reviewed. No pertinent surgical history.    Social History     Tobacco Use   • Smoking status: Never Smoker   • Smokeless tobacco: Former User     Types: Chew   Vaping Use   • Vaping Use: Never used   Substance Use Topics   • Alcohol use:  Not Currently     Alcohol/week: 1.2 oz     Types: 2 Standard drinks or equivalent per week     Comment: just weekend sometimes    • Drug use: Not Currently     Types: Marijuana     Comment: used to        Social History     Social History Narrative   • Not on file       History reviewed. No pertinent family history.    Health Maintenance: Completed    ROS:   Gen: no fevers/chills, no changes in weight  Eyes: no changes in vision  ENT: no sore throat, no hearing loss, no bloody nose  Pulm: no sob, no cough  CV: no chest pain, no palpitations  GI: no nausea/vomiting, no diarrhea  : no dysuria  MSk: Positive for occasional toe pain  Skin: no rash  Neuro: no headaches, positive for hand shaking  Heme/Lymph: no easy bruising      Objective:     Exam: /78   Pulse 99   Temp 36.3 °C (97.3 °F) (Temporal)   Resp 18   Ht 1.829 m (6')   Wt 123 kg (271 lb)   SpO2 96%  Body mass index is 36.75 kg/m².    General: Normal appearing. No distress.  HEENT: Normocephalic. Eyes conjunctiva clear lids without ptosis, pupils equal and reactive to light accommodation, ears normal shape and contour  Neck: Supple. Thyroid is not enlarged.  Pulmonary: Clear to ausculation.  Normal effort. No rales, ronchi, or wheezing.  Cardiovascular: Regular rate and rhythm without murmur. Carotid and radial pulses are intact and equal bilaterally.  Neuro: CN II-XII intact, strength 5/5 in all muscle groups, sensation intact bilaterally, reflexes 2+ bilaterally, coordination intact bilaterally, Romberg negative, mild hand tremor (right> left) with intentional movement, no tremor at rest, normal gait  Lymph: No cervical or supraclavicular lymph nodes are palpable  Skin: Warm and dry.  No obvious lesions.  Musculoskeletal: Normal gait. No extremity cyanosis, clubbing, or edema.  Psych: Normal mood and affect. Alert and oriented x3. Judgment and insight is normal.    Assessment & Plan:   46 y.o. male with the following -    1. Pain of toe of left  foot  Patient is not reporting any pain at this time.  He does have a history of left toe pain, however he has had his uric acid levels checked many times and it is always been normal.  He did see a podiatrist for this and was given an insole for his shoes.  He did report that recently had an episode of 3 days of left toe pain, however he had switched to a new pair shoes and did not have his insole at that time.  Since putting the insole back in his shoe, his symptoms have improved.  He will let me know in the future if he is experiencing an exacerbation of this.    2. Snoring  Patient has a STOP-BANG score of 5 indicating high risk for SHEILA.  He feels like his snoring is not that bothersome for him, however we did discuss if it is SHEILA the importance of getting it treated to prevent complications from it.  - Polysomnography, 4 or More; Future  - Referral to Pulmonary and Sleep Medicine    3. Obesity (BMI 35.0-39.9 without comorbidity)  Patient has been working on weight loss with eating a keto diet.  Did discuss the downside to a keto diet and would like him to focus on mostly eating whole foods low in saturated fats and fried foods.  Labs ordered to evaluate and a referral to nutritionist has been placed at patient request.  - Comp Metabolic Panel; Future  - Lipid Profile; Future  - TSH WITH REFLEX TO FT4; Future  - HEMOGLOBIN A1C; Future  - CBC WITHOUT DIFFERENTIAL; Future  - Referral to Nutrition Services    4. Essential tremor  This is a new diagnosis.  Patient meets criteria for diagnosis of essential tremor.  He states that this is only mildly bothersome for him so do not think that treatment is necessary at this time.  Did discuss stress, anxiety can make the symptoms worse.  We will continue to monitor.  Neurological exam is otherwise benign.    5. Cough  Patient has a recurrent history of pneumonia and needs albuterol when he is sick.  He was recently sick a month ago so he ran out of albuterol, however he  had not needed it for 2 years prior to that.  Lungs CTA today  - albuterol (PROVENTIL) 2.5mg/3ml Nebu Soln solution for nebulization; Take 3 mL by nebulization every 6 hours as needed for Shortness of Breath.  Dispense: 30 Each; Refill: 0    6. Need for vaccination  - Tdap Vaccine =>6YO IM    7. Encounter for nonprocreative screening for genetic disease carrier status  Patient is interested in participating in the [facility] project.  - Referral to Genetic Research Studies      I spent a total of 45 minutes with record review, exam, and communication with the patient, communication with other providers, and documentation of this encounter.     Return for follow up labs.    Please note that this dictation was created using voice recognition software. I have made every reasonable attempt to correct obvious errors, but I expect that there are errors of grammar and possibly content that I did not discover before finalizing the note.    Electronically signed by Britt Ortega PA-C on May 3, 2022

## 2022-05-03 NOTE — ASSESSMENT & PLAN NOTE
Patient reports that he was having an episode of dullness on his big toe on his left foot. This lasted for about 3 days and went away on its own. Was previously seen by podiatry and had insoles fitted and his pain has gone away. Uric acid was normal in the past.

## 2022-05-03 NOTE — RESEARCH NOTE
[employer] [place] [employer] enrollment complete. Saliva sample collected onsite. Patient enrolled in [facility].

## 2022-05-03 NOTE — ASSESSMENT & PLAN NOTE
Patient has noticed an occasional tremor in his hands (right worse than left).  Patient only notices it when he is intentionally trying to move his hand.'s never has symptoms at rest.  His symptoms also improve when he drinks alcohol.  He does notice that when he is more anxious or stressed his symptoms seem to be worse.  No other neurological changes.

## 2022-05-19 ENCOUNTER — NON-PROVIDER VISIT (OUTPATIENT)
Dept: INTERNAL MEDICINE | Facility: OTHER | Age: 47
End: 2022-05-19
Payer: COMMERCIAL

## 2022-05-19 VITALS — HEIGHT: 71 IN | WEIGHT: 275.4 LBS | BODY MASS INDEX: 38.56 KG/M2

## 2022-05-19 DIAGNOSIS — E78.2 MIXED HYPERLIPIDEMIA: ICD-10-CM

## 2022-05-19 DIAGNOSIS — E66.9 OBESITY (BMI 35.0-39.9 WITHOUT COMORBIDITY): ICD-10-CM

## 2022-05-19 PROCEDURE — 97802 MEDICAL NUTRITION INDIV IN: CPT | Performed by: DIETITIAN, REGISTERED

## 2022-05-19 ASSESSMENT — FIBROSIS 4 INDEX: FIB4 SCORE: 0.96

## 2022-05-19 NOTE — PROGRESS NOTES
"Raffaele Hall is a 46 y.o. year old, male initial nutrition assessment for weight loss.    Weight hx: lost 40 lbs, but can't keep off  Third time in life with up/down swings in weight    HABW: 310# gym program, lost 75#, kept off 2 years, slowly increased  Quit chewing, weight started to increase    Multiple \"diets\", keto, gym program    Wellness Vision:  I want to get down to a healthy weight so that I can play with my kids, keep skiing, so that I can have longevity and good health. I don't want to lose ability to hike and keep active at the level I want.    My Health Profile:    PHYSICAL ASSESSMENT  Current Height:  71\"  Ht Readings from Last 1 Encounters:   05/03/22 1.829 m (6')     Current Weight:  275.4#  Wt Readings from Last 1 Encounters:   05/03/22 123 kg (271 lb)     BMI:  38.4  Preferred weight:  220-225#    Total Body Water (lbs): 122.8#  Total Body Water (%): 44.6%  Visceral Fat: 23   (Range: Less than 13)  Total Body Fat: (lbs): 117#  % Body Fat: 42.5%   Muscle Mass (lbs): 150.6#  Muscle Mass (%): 55%  Fat-Free Mass: 158.4#  Resting Metabolic Rate: 2100  Weight Loss Calories: 0041-4438  Maintenance Calories: 2730    NUTRITION PHYSICAL ASSESSMENT:  wnl    FLUID INTAKE:  Stopped soda, no further migraines, >80 oz/day water  Typical Beverages: gatorade zero  Servings Alcohol/D/WK/MO:     ACTIVITY REVIEW: loves outdoors, had a gym membership  Current Exercise: hiking, skiing    PERTINENT LABS:    Latest Reference Range & Units 05/03/22 09:17   Glycohemoglobin 4.0 - 5.6 % 5.2 [1]   Estim. Avg Glu mg/dL 103 [2]   [   Latest Reference Range & Units 05/03/22 09:17   Cholesterol,Tot 100 - 199 mg/dL 195   Triglycerides 0 - 149 mg/dL 67   HDL >=40 mg/dL 38 !   LDL <100 mg/dL 144 (H)   Patient Behaviors (indicate frequency)  Meal/Snack Pattern:   When kids are not w/him:  No breakfast, lunch- two tacos, dinner at 2000- trail mix  With kids: dine out to sushi    No routine now    Gym program: 5-6 small meals/day, " "regimented; no variety. Structured meal plans    Dines away from Home: twice a week; shares kids one week on/one week off  Locations:    Who lives in home: self or kids when he has them  Additional Patient Behavior Information: \"fasting\", restricts if weight up on scale 8 lbs; scale determines how he eats    Quick taco w/low carb tortilla  [place], broccoli, salads, asparagus    Tends to make better choices when kids are with him; occ pasta, limiting breads  \"Keto\" bread    Barriers: not being active    PES: Obesity III w/hyperlipidemia r/t inconsistent eating with intermittent fasting practices with nutrition knowledge deficit as evidenced by BMI 38,  and HDL 38    NUTRITION COMMENTS:    Raffaele reports two of his children are on the \"obese side\" , desires to be a role model for them as well.    Raffaele struggles with the roller coaster of the scale weight; stays away from fried foods, but restrictive eating with most days he does not have his kids.    Consistency will be new focus for lifestyle medicine practices.    RTC x 2 weeks    Time Spent: 60 minutes    "

## 2022-05-19 NOTE — PATIENT INSTRUCTIONS
"Nutrition Commitments:    I will balance out my weeks on and weeks off with my kids  I will get out and be more active  Walks 2-3 times per week      My Health Profile:    PHYSICAL ASSESSMENT  Current Height:  71\"  Ht Readings from Last 1 Encounters:   05/03/22 1.829 m (6')     Current Weight:  275.4#  Wt Readings from Last 1 Encounters:   05/03/22 123 kg (271 lb)     BMI:  38.4  Preferred weight:  220-225#    Total Body Water (lbs): 122.8#  Total Body Water (%): 44.6%  Visceral Fat: 23   (Range: Less than 13)  Total Body Fat: (lbs): 117#  % Body Fat: 42.5%   Muscle Mass (lbs): 150.6#  Muscle Mass (%): 55%  Fat-Free Mass: 158.4#  Resting Metabolic Rate: 2100  Weight Loss Calories: 2904-8953  Maintenance Calories: 2730      "

## 2022-05-26 LAB
APOB+LDLR+PCSK9 GENE MUT ANL BLD/T: NOT DETECTED
BRCA1+BRCA2 DEL+DUP + FULL MUT ANL BLD/T: NOT DETECTED
MLH1+MSH2+MSH6+PMS2 GN DEL+DUP+FUL M: NOT DETECTED

## 2022-06-08 ENCOUNTER — NON-PROVIDER VISIT (OUTPATIENT)
Dept: INTERNAL MEDICINE | Facility: OTHER | Age: 47
End: 2022-06-08
Payer: COMMERCIAL

## 2022-06-08 DIAGNOSIS — E66.9 OBESITY (BMI 35.0-39.9 WITHOUT COMORBIDITY): ICD-10-CM

## 2022-06-08 PROCEDURE — 97803 MED NUTRITION INDIV SUBSEQ: CPT | Performed by: DIETITIAN, REGISTERED

## 2022-06-08 NOTE — PROGRESS NOTES
Raffaele Hall is a 47 y.o. year old, male returns for weight management follow up.    Positive changes since last visit:  Attentive to eat more often without kids  Brought in two times for fueling: something for breakfast- fruit/carb, coffee/egg sandwich    Dinner has been dialed in with healthier options, more veggies- low carb tortilla and lower carb keto breads are continued in eating plan    Weight has been maintained as Raffaele sets his environment up for weight loss; expect shifting in weight    Meal/Eating/Environment Pattern:  Challenge: Lunch- finding meals at lunch depending where I am at, travelling for work    Snacks: consists mainly of one food group- cheese    Strategies to bring the healthy equation together for greater support and not caught without.    Comments:  Maintain work he is doing to ignite his weight loss momentum.   Environmental controls: add in and subtractions.    RTC x one month     Time Spent:  60 minutes

## 2022-06-09 VITALS — BODY MASS INDEX: 38.77 KG/M2 | WEIGHT: 278 LBS

## 2022-06-09 ASSESSMENT — FIBROSIS 4 INDEX: FIB4 SCORE: 0.98

## 2022-06-12 ENCOUNTER — OFFICE VISIT (OUTPATIENT)
Dept: URGENT CARE | Facility: PHYSICIAN GROUP | Age: 47
End: 2022-06-12
Payer: COMMERCIAL

## 2022-06-12 VITALS
DIASTOLIC BLOOD PRESSURE: 82 MMHG | HEIGHT: 72 IN | WEIGHT: 267 LBS | BODY MASS INDEX: 36.16 KG/M2 | OXYGEN SATURATION: 98 % | RESPIRATION RATE: 16 BRPM | TEMPERATURE: 99.3 F | SYSTOLIC BLOOD PRESSURE: 126 MMHG | HEART RATE: 94 BPM

## 2022-06-12 DIAGNOSIS — K13.70 ORAL LESION: ICD-10-CM

## 2022-06-12 DIAGNOSIS — R50.9 FEVER, UNSPECIFIED FEVER CAUSE: ICD-10-CM

## 2022-06-12 DIAGNOSIS — I89.0 LYMPHEDEMA: ICD-10-CM

## 2022-06-12 PROCEDURE — 99213 OFFICE O/P EST LOW 20 MIN: CPT | Performed by: PHYSICIAN ASSISTANT

## 2022-06-12 RX ORDER — LIDOCAINE HYDROCHLORIDE 20 MG/ML
5 SOLUTION OROPHARYNGEAL PRN
Qty: 120 ML | Refills: 0 | Status: SHIPPED | OUTPATIENT
Start: 2022-06-12 | End: 2022-08-03

## 2022-06-12 RX ORDER — DEXAMETHASONE SODIUM PHOSPHATE 10 MG/ML
10 INJECTION INTRAMUSCULAR; INTRAVENOUS ONCE
Status: COMPLETED | OUTPATIENT
Start: 2022-06-12 | End: 2022-06-12

## 2022-06-12 RX ORDER — CLINDAMYCIN HYDROCHLORIDE 300 MG/1
300 CAPSULE ORAL 3 TIMES DAILY
Qty: 30 CAPSULE | Refills: 0 | Status: SHIPPED | OUTPATIENT
Start: 2022-06-12 | End: 2022-06-22

## 2022-06-12 RX ADMIN — DEXAMETHASONE SODIUM PHOSPHATE 10 MG: 10 INJECTION INTRAMUSCULAR; INTRAVENOUS at 11:42

## 2022-06-12 ASSESSMENT — ENCOUNTER SYMPTOMS
WHEEZING: 0
MYALGIAS: 1
FEVER: 1
ABDOMINAL PAIN: 0
VOMITING: 0
SORE THROAT: 0
CHILLS: 0
SPUTUM PRODUCTION: 0
DIARRHEA: 0
NAUSEA: 0
COUGH: 0
SHORTNESS OF BREATH: 0

## 2022-06-12 ASSESSMENT — FIBROSIS 4 INDEX: FIB4 SCORE: 0.98

## 2022-06-12 NOTE — PROGRESS NOTES
Subjective:   Raffaele Hall  is a 47 y.o. male who presents for Other (Noticed blisters in gums and radiating under his tongue and noticed bumps in his (L) jaw x 1 week)      Other  This is a new problem. The current episode started in the past 7 days. Associated symptoms include a fever ( subj) and myalgias. Pertinent negatives include no abdominal pain, chills, congestion, coughing, nausea, rash, sore throat or vomiting.   Patient presents urgent care noting last approximate 1 week of symptoms that have been progressively worsening.  He was traveling and was eating out and bit the side of his mouth causing a wound.  He notes progression of increased inflammation and lesions within his mouth extending out from this original injury.  He has had subjective fever and chills over the last 3 to 4 days.  Describes sensation of swelling along left jawline.  Denies dental pain or dentition of concern in this area.  Complains of spreading oral lesions that are painful from left towards right side of mouth.  He states this has decreased his oral intake and affected sleep.  Denies cough.  Denies ear pain.  Denies sore throat.  Denies nausea vomiting abdominal pain diarrhea or rash.  Denies loss of taste or smell.  Has been using multiple OTC oral analgesics as well as OTC Tylenol and ibuprofen.  Denies obvious sick contacts.  Denies past medical history of similar.    Review of Systems   Constitutional: Positive for fever ( subj). Negative for chills.   HENT: Negative for congestion, ear pain and sore throat.         Oral lesions   Respiratory: Negative for cough, sputum production, shortness of breath and wheezing.    Gastrointestinal: Negative for abdominal pain, diarrhea, nausea and vomiting.   Musculoskeletal: Positive for myalgias.   Skin: Negative for rash.       Allergies   Allergen Reactions   • Pcn [Penicillins]      Child kebede        Objective:   /82 (BP Location: Right arm, Patient Position: Sitting, BP Cuff  Size: Large adult)   Pulse 94   Temp 37.4 °C (99.3 °F) (Temporal)   Resp 16   Ht 1.829 m (6')   Wt 121 kg (267 lb)   SpO2 98%   BMI 36.21 kg/m²     Physical Exam  Vitals and nursing note reviewed.   Constitutional:       General: He is not in acute distress.     Appearance: He is well-developed. He is not diaphoretic.   HENT:      Head: Normocephalic and atraumatic.      Right Ear: Tympanic membrane, ear canal and external ear normal.      Left Ear: Tympanic membrane, ear canal and external ear normal.      Nose: Nose normal.      Mouth/Throat:      Lips: Pink.      Mouth: Mucous membranes are moist. Oral lesions present.      Dentition: Normal dentition. No dental tenderness, gingival swelling, dental caries or dental abscesses.      Pharynx: Uvula midline. Posterior oropharyngeal erythema ( mild PND) present. No oropharyngeal exudate.      Tonsils: No tonsillar abscesses.      Comments: Evident prior dental work, no obvious areas of concern or pain, lymphadenopathy to left infra mandibular area concerning for possible dental infection  Eyes:      General: No scleral icterus.        Right eye: No discharge.         Left eye: No discharge.      Conjunctiva/sclera: Conjunctivae normal.   Pulmonary:      Effort: Pulmonary effort is normal. No respiratory distress.      Breath sounds: No decreased breath sounds, wheezing, rhonchi or rales.   Musculoskeletal:         General: Normal range of motion.      Cervical back: Neck supple.   Lymphadenopathy:      Cervical: Cervical adenopathy (mild bilat, L>R) present.   Skin:     General: Skin is warm and dry.      Coloration: Skin is not pale.   Neurological:      Mental Status: He is alert and oriented to person, place, and time.      Coordination: Coordination normal.     Decadron 10 mg orally-tolerates well  Assessment/Plan:   1. Oral lesion  - dexamethasone (DECADRON) injection (check route below) 10 mg  - lidocaine (XYLOCAINE) 2 % Solution; Take 5 mL by mouth as  needed for Throat/Mouth Pain (q6hr PRN throat pain, ok to rinse and spit or swallow).  Dispense: 120 mL; Refill: 0  - clindamycin (CLEOCIN) 300 MG Cap; Take 1 Capsule by mouth 3 times a day for 10 days.  Dispense: 30 Capsule; Refill: 0    2. Lymphedema  - dexamethasone (DECADRON) injection (check route below) 10 mg  - lidocaine (XYLOCAINE) 2 % Solution; Take 5 mL by mouth as needed for Throat/Mouth Pain (q6hr PRN throat pain, ok to rinse and spit or swallow).  Dispense: 120 mL; Refill: 0  - clindamycin (CLEOCIN) 300 MG Cap; Take 1 Capsule by mouth 3 times a day for 10 days.  Dispense: 30 Capsule; Refill: 0    3. Fever, unspecified fever cause  - dexamethasone (DECADRON) injection (check route below) 10 mg  - lidocaine (XYLOCAINE) 2 % Solution; Take 5 mL by mouth as needed for Throat/Mouth Pain (q6hr PRN throat pain, ok to rinse and spit or swallow).  Dispense: 120 mL; Refill: 0  - clindamycin (CLEOCIN) 300 MG Cap; Take 1 Capsule by mouth 3 times a day for 10 days.  Dispense: 30 Capsule; Refill: 0  Supportive care is reviewed with patient/caregiver - recommend to push PO fluids and electrolytes,  take full course of Rx, take with probiotics, observe for resolution  Return to clinic with lack of resolution or progression of symptoms.  OTC fever reducers, f/u w/ dentist  ER precautions with any worsening symptoms are reviewed with patient/caregiver and they do express understanding    I have worn an N95 mask, gloves and eye protection for the entire encounter with this patient.     Differential diagnosis, natural history, supportive care, and indications for immediate follow-up discussed.

## 2022-06-14 ENCOUNTER — OFFICE VISIT (OUTPATIENT)
Dept: MEDICAL GROUP | Facility: PHYSICIAN GROUP | Age: 47
End: 2022-06-14
Payer: COMMERCIAL

## 2022-06-14 ENCOUNTER — HOSPITAL ENCOUNTER (OUTPATIENT)
Dept: LAB | Facility: MEDICAL CENTER | Age: 47
End: 2022-06-14
Attending: PHYSICIAN ASSISTANT
Payer: COMMERCIAL

## 2022-06-14 VITALS
RESPIRATION RATE: 18 BRPM | WEIGHT: 269 LBS | HEART RATE: 76 BPM | OXYGEN SATURATION: 99 % | BODY MASS INDEX: 36.44 KG/M2 | DIASTOLIC BLOOD PRESSURE: 80 MMHG | TEMPERATURE: 98.6 F | SYSTOLIC BLOOD PRESSURE: 130 MMHG | HEIGHT: 72 IN

## 2022-06-14 DIAGNOSIS — K12.1 MOUTH ULCER: ICD-10-CM

## 2022-06-14 LAB
BASOPHILS # BLD AUTO: 0.4 % (ref 0–1.8)
BASOPHILS # BLD: 0.03 K/UL (ref 0–0.12)
EOSINOPHIL # BLD AUTO: 0.03 K/UL (ref 0–0.51)
EOSINOPHIL NFR BLD: 0.4 % (ref 0–6.9)
ERYTHROCYTE [DISTWIDTH] IN BLOOD BY AUTOMATED COUNT: 41.2 FL (ref 35.9–50)
HCT VFR BLD AUTO: 48.2 % (ref 42–52)
HGB BLD-MCNC: 16 G/DL (ref 14–18)
HIV 1+2 AB+HIV1 P24 AG SERPL QL IA: NORMAL
IMM GRANULOCYTES # BLD AUTO: 0.02 K/UL (ref 0–0.11)
IMM GRANULOCYTES NFR BLD AUTO: 0.3 % (ref 0–0.9)
LYMPHOCYTES # BLD AUTO: 1.15 K/UL (ref 1–4.8)
LYMPHOCYTES NFR BLD: 14.7 % (ref 22–41)
MCH RBC QN AUTO: 29.2 PG (ref 27–33)
MCHC RBC AUTO-ENTMCNC: 33.2 G/DL (ref 33.7–35.3)
MCV RBC AUTO: 88 FL (ref 81.4–97.8)
MONOCYTES # BLD AUTO: 0.85 K/UL (ref 0–0.85)
MONOCYTES NFR BLD AUTO: 10.9 % (ref 0–13.4)
NEUTROPHILS # BLD AUTO: 5.73 K/UL (ref 1.82–7.42)
NEUTROPHILS NFR BLD: 73.3 % (ref 44–72)
NRBC # BLD AUTO: 0 K/UL
NRBC BLD-RTO: 0 /100 WBC
PLATELET # BLD AUTO: 193 K/UL (ref 164–446)
PMV BLD AUTO: 10.4 FL (ref 9–12.9)
RBC # BLD AUTO: 5.48 M/UL (ref 4.7–6.1)
T PALLIDUM AB SER QL IA: NORMAL
TSH SERPL DL<=0.005 MIU/L-ACNC: 2.71 UIU/ML (ref 0.38–5.33)
WBC # BLD AUTO: 7.8 K/UL (ref 4.8–10.8)

## 2022-06-14 PROCEDURE — 99214 OFFICE O/P EST MOD 30 MIN: CPT | Performed by: PHYSICIAN ASSISTANT

## 2022-06-14 PROCEDURE — 85025 COMPLETE CBC W/AUTO DIFF WBC: CPT

## 2022-06-14 PROCEDURE — 86780 TREPONEMA PALLIDUM: CPT

## 2022-06-14 PROCEDURE — 86694 HERPES SIMPLEX NES ANTBDY: CPT

## 2022-06-14 PROCEDURE — 87389 HIV-1 AG W/HIV-1&-2 AB AG IA: CPT

## 2022-06-14 PROCEDURE — 36415 COLL VENOUS BLD VENIPUNCTURE: CPT

## 2022-06-14 PROCEDURE — 84443 ASSAY THYROID STIM HORMONE: CPT

## 2022-06-14 RX ORDER — DIPHENHYDRAMINE HYDROCHLORIDE AND LIDOCAINE HYDROCHLORIDE AND ALUMINUM HYDROXIDE AND MAGNESIUM HYDRO
KIT
Qty: 150 ML | Refills: 0 | Status: SHIPPED | OUTPATIENT
Start: 2022-06-14 | End: 2022-08-03

## 2022-06-14 ASSESSMENT — FIBROSIS 4 INDEX: FIB4 SCORE: 0.98

## 2022-06-14 NOTE — ASSESSMENT & PLAN NOTE
Patient was recently evaluated at urgent care for 1 and half weeks of a sensation of his mouth burning.  Since that he started getting more sores in his mouth that have become severely painful where he cannot eat or sleep.  The sores have spread all over his mouth and tongue and they are very tender. Has also taken ibuprofen which has helped. Also took benadryl which might have helped a little bit also.

## 2022-06-14 NOTE — PROGRESS NOTES
Subjective:     CC: UC follow up mouth ulcer     HPI:   Raffaele presents today with     Mouth ulcer  Patient was recently evaluated at urgent care for 1 and half weeks of a sensation of his mouth burning.  Since that he started getting more sores in his mouth that have become severely painful where he cannot eat or sleep.  The sores have spread all over his mouth and tongue and they are very tender. Has also taken ibuprofen which has helped. Also took benadryl which might have helped a little bit also.       Past Medical History:   Diagnosis Date   • Allergy        Social History     Tobacco Use   • Smoking status: Never Smoker   • Smokeless tobacco: Former User     Types: Chew     Quit date: 3/1/2021   Vaping Use   • Vaping Use: Never used   Substance Use Topics   • Alcohol use: Not Currently     Alcohol/week: 1.2 oz     Types: 2 Standard drinks or equivalent per week     Comment: just weekend sometimes    • Drug use: Not Currently     Types: Marijuana     Comment: occ       Current Outpatient Medications Ordered in Epic   Medication Sig Dispense Refill   • DPH-Lido-AlHydr-MgHydr-Simeth (MAGIC MOUTHWASH BLM) Suspension Swish 5 mL around mouth for 2 minutes and then spit it out every 6 hours as needed for mouth pain 150 mL 0   • lidocaine (XYLOCAINE) 2 % Solution Take 5 mL by mouth as needed for Throat/Mouth Pain (q6hr PRN throat pain, ok to rinse and spit or swallow). 120 mL 0   • clindamycin (CLEOCIN) 300 MG Cap Take 1 Capsule by mouth 3 times a day for 10 days. 30 Capsule 0   • albuterol (PROVENTIL) 2.5mg/3ml Nebu Soln solution for nebulization Take 3 mL by nebulization every 6 hours as needed for Shortness of Breath. (Patient not taking: Reported on 6/14/2022) 30 Each 0     No current [facility]-ordered facility-administered medications on file.       Allergies:  Pcn [penicillins]    Health Maintenance: Completed    ROS:  Gen: no fevers/chills  Eyes: no changes in vision  ENT: positive for mouth sores   Pulm: no sob,  no cough  CV: no chest pain  GI: no nausea/vomiting  MSk: no myalgias  Skin: no rash  Neuro: no headaches    Objective:       Exam:  /80   Pulse 76   Temp 37 °C (98.6 °F) (Temporal)   Resp 18   Ht 1.829 m (6')   Wt 122 kg (269 lb)   SpO2 99%   BMI 36.48 kg/m²  Body mass index is 36.48 kg/m².    Gen: Alert and oriented, No apparent distress.  Skin: Warm, dry, good turgor, erythema of upper extremities bilaterally, warm to touch  HEENT: Normocephalic. Eyes conjunctiva clear lids without ptosis, pupils equal and reactive to light accommodation, ears normal shape and contour numerous (10+) ulcerations of oral mucosa (inner cheeks and under tongue), worst left inner cheek  Neck: Trachea midline, no masses, no thyromegaly  Lungs: Normal effort, CTA bilaterally, no wheezes, rhonchi, or rales  CV: Regular rate and rhythm. No murmurs, rubs, or gallops.  MSK: Normal gait, moves all extremities.  Neuro: Grossly non-focal.  Ext: No clubbing, cyanosis, edema.  Psych: Alert and oriented x3, normal affect and mood.      Assessment & Plan:     47 y.o. male with the following -     1. Mouth ulcer  Acute. Highly suspect aphthous ulcers, however will do blood work to further evaluate. Magic mouthwash sent in to help with symptoms. Recommended discontinuing clindamycin as patient was having significant erythema of the upper extremities since starting the medication and his risk for C. difficile is high.  Do not suspect dental infection at this time as patient is afebrile, and all the lesions are located on the side of his cheeks or under his tongue.  He is able to clench his teeth without any pain.  Recommended he alternate Tylenol and ibuprofen for the next few days to help control pain.  Also discussed the importance of staying well-hydrated even though it may hurt.  Patient does not have lesions anywhere else I do not suspect bechet syndrome, SJS/TEN at this time.  He did not have any new medications prior to the start  of these ulcerations.  Did discuss that if he develops any new or worsening symptoms I would like to see him back in clinic for reevaluation.  Natural course of aphthous ulcers discussed with the patient.  Did discuss if he is unable to tolerate fluids he will need to go to the emergency room for IV fluids.  We will follow-up with the patient once I have the results of his blood work  - CBC WITH DIFFERENTIAL; Future  - HSV 1/2 IGG W/ TYPE SPECIFIC RFLX; Future  - HIV AG/AB COMBO ASSAY SCREENING; Future  - TSH WITH REFLEX TO FT4; Future  - DPH-Lido-AlHydr-MgHydr-Simeth (MAGIC MOUTHWASH BLM) Suspension; Swish 5 mL around mouth for 2 minutes and then spit it out every 6 hours as needed for mouth pain  Dispense: 150 mL; Refill: 0    I spent a total of 30 minutes with record review (including external notes and labs), exam, communication with the patient, communication with other providers, and documentation of this encounter.     Return in about 1 week (around 6/21/2022), or if symptoms worsen or fail to improve.    Please note that this dictation was created using voice recognition software. I have made every reasonable attempt to correct obvious errors, but I expect that there are errors of grammar and possibly content that I did not discover before finalizing the note.    Electronically signed by Britt Ortega PA-C on June 14, 2022

## 2022-06-17 LAB — HSV1+2 IGG SER IA-ACNC: 0.45 IV

## 2022-07-13 ENCOUNTER — NON-PROVIDER VISIT (OUTPATIENT)
Dept: INTERNAL MEDICINE | Facility: OTHER | Age: 47
End: 2022-07-13
Payer: COMMERCIAL

## 2022-07-13 VITALS — BODY MASS INDEX: 37.03 KG/M2 | WEIGHT: 273 LBS

## 2022-07-13 DIAGNOSIS — E66.9 OBESITY (BMI 35.0-39.9 WITHOUT COMORBIDITY): ICD-10-CM

## 2022-07-13 PROCEDURE — 97803 MED NUTRITION INDIV SUBSEQ: CPT | Performed by: DIETITIAN, REGISTERED

## 2022-07-13 ASSESSMENT — FIBROSIS 4 INDEX: FIB4 SCORE: 1.09

## 2022-07-13 NOTE — PATIENT INSTRUCTIONS
Visit Eat This Much website  - aim for [id number] kcals/day  - 115 gram protein  - 40 CHO/30 Protein/30 Fat    I will keep my 3/7 evening walks  - increase intensity- elevation, trails, challenging   - NEAT activity- non exercise activity thermogenesis    Let my learn

## 2022-07-13 NOTE — PROGRESS NOTES
Raffaele Hall is a 47 y.o. year old, male returns for follow up weight management. Raffaele reports he had a strange mouth sore incident that lasted two weeks; not able to eat or drink.     Weight loss seen, but back up with dehyrdated/depleted state    Positive changes since last visit:  No longer on coffee/tea pouch for chewing cessation  Seeing a therapist for daughter's eating concerns    Ready for a re-boot in his weight loss efforts    Meal/Eating/Environment Pattern:  Orgain in the morning back in, routine restarted.    Challenge: meal prep, need to grocery shop; children eating habits and aligning with healthy practices    Environmental challenges- desiring to make one meal and not variations for children    Comments:  Raffaele has a total of 6-visits with RDN    Reinforced new commitments to re-boot and form healthy practices when life hits.      RTC x September    Time Spent:  60 minutes

## 2022-08-03 ENCOUNTER — OFFICE VISIT (OUTPATIENT)
Dept: SLEEP MEDICINE | Facility: MEDICAL CENTER | Age: 47
End: 2022-08-03
Payer: COMMERCIAL

## 2022-08-03 VITALS
HEIGHT: 72 IN | OXYGEN SATURATION: 94 % | WEIGHT: 269 LBS | BODY MASS INDEX: 36.44 KG/M2 | HEART RATE: 66 BPM | DIASTOLIC BLOOD PRESSURE: 72 MMHG | RESPIRATION RATE: 14 BRPM | SYSTOLIC BLOOD PRESSURE: 100 MMHG

## 2022-08-03 DIAGNOSIS — G47.00 FREQUENT NOCTURNAL AWAKENING: ICD-10-CM

## 2022-08-03 DIAGNOSIS — E66.9 OBESITY (BMI 35.0-39.9 WITHOUT COMORBIDITY): ICD-10-CM

## 2022-08-03 DIAGNOSIS — G47.30 SLEEP DISORDER BREATHING: Primary | ICD-10-CM

## 2022-08-03 PROCEDURE — 99203 OFFICE O/P NEW LOW 30 MIN: CPT | Performed by: STUDENT IN AN ORGANIZED HEALTH CARE EDUCATION/TRAINING PROGRAM

## 2022-08-03 ASSESSMENT — FIBROSIS 4 INDEX: FIB4 SCORE: 1.09

## 2022-08-03 NOTE — PROGRESS NOTES
[facility] Consult Note     Date: 8/3/2022 / Time: 8:07 AM      Thank you for requesting a sleep medicine consultation on Raffaele Hall at the sleep center. Presents today with the chief complaints of snoring, occasional daytime fatigue. He is referred by Britt Ortega P.A.-C.  [address] [place] [place] [address] for evaluation and treatment of sleep disorder breathing.     HISTORY OF PRESENT ILLNESS:     Raffaele Hall is a 47 y.o. male with obesity, history of headaches.  Presents to Sleep Clinic for evaluation for potential sleep apnea.    He states since childhood he has had trouble with sleep.  Feels she has always woken up 4-6 times a night.  He generally has no trouble falling asleep or getting back to sleep after awakenings.  He is sharing the bed with his girlfriend who does report he snores in his sleep.  This is what led to today's appointment.    He does feel his energy level is low at times during the day.  During his sleep he does occasionally have night sweats, grind his teeth, wake up with headaches and kicking his legs in his sleep.    As per supplemental questionnaire to be scanned or imported into chart:    Dexter Sleepiness Score: 7    Sleep Schedule  Bedtime: Weekday 11pm Weekend 11pm  Wake time: Weekday 5am Weekend 5-7am  Sleep-onset latency: 15 min  Awakenings from sleep: 4-5  Difficulty falling back asleep: generally not   Bedroom partner: yes  Naps: No     DAYTIME SYMPTOMS:   Excessive daytime sleepiness: No   Daytime fatigue: Yes  Difficulty concentrating: No   Memory problems: Yes  Irritability:Yes  Work/school performance issues: No   Sleepiness with driving: No   Caffeine/stimulant use: Yes couple times a week  Alcohol use:Yes, How Many? 2-3 drinks on weekends      SLEEP RELATED SYMPTOMS  Snoring: Yes  Witnessed apnea or gasping/choking: No   Dry mouth or mouth breathing: No   Sweating: Yes  Teeth grinding/biting: Yes  Morning headaches:  "Yes  Refreshed Upon Awakening: No      SLEEP RELATED BEHAVIORS:  Parasomnias (walking, talking, eating, violence): No   Leg kicking: Yes  Restless legs - \"urge to move\": No   Nightmares: No  Recurrent: No   Dream enactment: No      NARCOLEPSY:  Cataplexy: No   Sleep paralysis: No   Sleep attacks: No   Hypnagogic/hypnopompic hallucinations: No     MEDICAL HISTORY  Past Medical History:   Diagnosis Date   • Allergy         SURGICAL HISTORY  History reviewed. No pertinent surgical history.     FAMILY HISTORY  History reviewed. No pertinent family history.    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     No current facility-administered medications for this visit.       REVIEW OF SYSTEMS  Constitutional: Denies fevers, Denies weight changes  Ears/Nose/Throat/Mouth: Denies nasal congestion or sore throat   Cardiovascular: Denies chest pain  Respiratory: Denies shortness of breath, Denies cough  Gastrointestinal/Hepatic: Denies nausea, vomiting  Sleep: see HPI    Physical Examination:  Vitals/ General Appearance:   Weight/BMI: Body mass index is 36.48 kg/m².  /72 (BP Location: Left arm, Patient Position: Sitting, BP Cuff Size: Adult)   Pulse 66   Resp 14   Ht 1.829 m (6')   Wt 122 kg (269 lb)   SpO2 94%   Vitals:    08/03/22 0802   BP: 100/72   BP Location: Left arm   Patient Position: Sitting   BP Cuff Size: " Adult   Pulse: 66   Resp: 14   SpO2: 94%   Weight: 122 kg (269 lb)   Height: 1.829 m (6')       Pt. is alert and oriented to time, place and person. Cooperative and in no apparent distress.     Constitutional: Alert, no distress, well-groomed.  Skin: No rashes in visible areas.  Eye: Round. Conjunctiva clear, lids normal. No icterus.   ENT EXAM  Nasal alae/valves collapsible: No   Nasal septum deviation: No   Nasal turbinate hypertrophy: Left: Grade 1   Right: Grade 1  Hard palate narrow: No   Hard palate high: No   Soft palate/uvula (Mallampati score): 2  Tongue Scalloping: No   Retrognathia: No   Micrognathia: No   Cardiovascular:no murmus/gallops/rubs, normal S1 and S2 heart sounds, regular rate and rhythm  Pulmonary:Clear to auscultation, No wheezes, No crackles.  Neurologic:Awake, alert and oriented x 3, Normal age appropriate gait, No involuntary motions.  Extremities: No clubbing, cyanosis, or edema       ASSESSMENT AND PLAN   Raffaele Hall is a 47 y.o. male with obesity, history of headaches.  Presents to Sleep Clinic for evaluation for potential sleep apnea.    1. Raffaele Hall  has symptoms of Obstructive Sleep Apnea (SHEILA). Raffaele Hall has symptoms of snoring,  morning headaches, unrefreshed upon awakening. These  interfere with activities of daily living.     Pt has risk factors for SHEILA include obesity, age, and crowded oropharynx.     The pathophysiology of SHEILA and the increased risk of cardiovascular morbidity from untreated SHEILA is discussed in detail with the patient.       We have discussed diagnostic options including in-laboratory, attended polysomnography and home sleep testing. We have also discussed treatment options including airway pressurization, reconstructive otolaryngologic surgery, dental appliances and weight management.     Subsequently,treatment options will be discussed based on the diagnostic study. Meanwhile, He is urged to avoid supine sleep, weight gain and alcoholic  beverages since all of these can worsen SHEILA. He is cautioned against drowsy driving. If He feels sleepy while driving, advised must pull over for a break/nap, rather than persist on the road, in the interest of Pt's own safety and that of others on the road.    Patient reports switching insurances at the beginning of next month.  Due to this we will need a follow-up on new insurance and have a order for a sleep study placed.    Plan  -We will plan to schedule a home sleep study once on new insurance  - Follow up 1-2 weeks after sleep study to discuss results and treatment options moving forward   -Advised to reach out via [employer] or by phone with any questions or concerns.     2.  Regarding treatment of other past medical problems and general health maintenance,  Pt is urged to follow up with PCP.      Please note portions of this record was created using voice recognition software. I have made every reasonable attempt to correct obvious errors, but I expect that there are errors of grammar and possibly content I did not discover before finalizing the note.

## 2024-05-13 PROBLEM — S83.8X1A ACUTE LATERAL MENISCAL INJURY OF RIGHT KNEE: Status: ACTIVE | Noted: 2024-05-13

## 2024-05-13 PROBLEM — S83.511A NEW ACL TEAR, RIGHT, INITIAL ENCOUNTER: Status: ACTIVE | Noted: 2024-05-13

## 2024-12-13 ENCOUNTER — OFFICE VISIT (OUTPATIENT)
Dept: URGENT CARE | Facility: PHYSICIAN GROUP | Age: 49
End: 2024-12-13
Payer: COMMERCIAL

## 2024-12-13 VITALS
BODY MASS INDEX: 38.17 KG/M2 | OXYGEN SATURATION: 98 % | HEIGHT: 72 IN | HEART RATE: 90 BPM | RESPIRATION RATE: 16 BRPM | DIASTOLIC BLOOD PRESSURE: 88 MMHG | SYSTOLIC BLOOD PRESSURE: 128 MMHG | WEIGHT: 281.8 LBS | TEMPERATURE: 97.7 F

## 2024-12-13 DIAGNOSIS — J01.90 ACUTE BACTERIAL SINUSITIS: Primary | ICD-10-CM

## 2024-12-13 DIAGNOSIS — B96.89 ACUTE BACTERIAL SINUSITIS: Primary | ICD-10-CM

## 2024-12-13 PROCEDURE — 3079F DIAST BP 80-89 MM HG: CPT

## 2024-12-13 PROCEDURE — 99214 OFFICE O/P EST MOD 30 MIN: CPT

## 2024-12-13 PROCEDURE — 3074F SYST BP LT 130 MM HG: CPT

## 2024-12-13 RX ORDER — CETIRIZINE HYDROCHLORIDE 10 MG/1
10 TABLET ORAL DAILY
Qty: 30 TABLET | Refills: 0 | Status: SHIPPED | OUTPATIENT
Start: 2024-12-13

## 2024-12-13 RX ORDER — PREDNISONE 10 MG/1
20 TABLET ORAL DAILY
Qty: 6 TABLET | Refills: 0 | Status: SHIPPED | OUTPATIENT
Start: 2024-12-13 | End: 2024-12-16

## 2024-12-13 RX ORDER — DOXYCYCLINE HYCLATE 100 MG
100 TABLET ORAL 2 TIMES DAILY
Qty: 14 TABLET | Refills: 0 | Status: SHIPPED | OUTPATIENT
Start: 2024-12-13 | End: 2024-12-20

## 2024-12-13 RX ORDER — FLUTICASONE PROPIONATE 50 MCG
1 SPRAY, SUSPENSION (ML) NASAL DAILY
Qty: 16 G | Refills: 0 | Status: SHIPPED | OUTPATIENT
Start: 2024-12-13

## 2024-12-13 ASSESSMENT — ENCOUNTER SYMPTOMS
BLURRED VISION: 0
SHORTNESS OF BREATH: 0
DEPRESSION: 0
SORE THROAT: 0
HEADACHES: 1
HEMOPTYSIS: 0
DOUBLE VISION: 0
SPUTUM PRODUCTION: 0
VOMITING: 0
EYE DISCHARGE: 0
EYE PAIN: 0
SINUS PAIN: 1
PALPITATIONS: 0
DIZZINESS: 0
EYE REDNESS: 0
CHILLS: 1
NAUSEA: 0
PHOTOPHOBIA: 0
MYALGIAS: 0
HEARTBURN: 0
WHEEZING: 0
COUGH: 0

## 2024-12-13 NOTE — PROGRESS NOTES
Subjective:   Raffaele Hall is a 49 y.o. male who presents for Pharyngitis (Cough, headache, chills x couple weeks )          I introduced myself to the patient and informed them that I am a Family Nurse Practitioner.    HPI: Raffaele is a 49-year-old male who who comes in today with c/o sinus pain and pressure, thick green nasal drainage, tactile fever, headache, malaise. Onset was over 2 weeks ago, much worse in the last 2 days.  Patient describes symptoms as constant. They describe the pain as headache, aching and pressure in the area of the sinuses. Aggravating factors include leaning forward, blowing their nose. Relieving factors include none. Treatments tried at home include  Zicam with no relief . They describe their symptoms as moderate.  Patient states that they have had several bacterial sinus infections in the past and this appears consistent.     Review of Systems   Constitutional:  Positive for chills and malaise/fatigue.   HENT:  Positive for congestion and sinus pain. Negative for ear discharge, ear pain, hearing loss and sore throat.    Eyes:  Negative for blurred vision, double vision, photophobia, pain, discharge and redness.   Respiratory:  Negative for cough, hemoptysis, sputum production, shortness of breath and wheezing.    Cardiovascular:  Negative for chest pain and palpitations.   Gastrointestinal:  Negative for heartburn, nausea and vomiting.   Genitourinary:  Negative for dysuria.   Musculoskeletal:  Negative for myalgias.   Skin:  Negative for rash.   Neurological:  Positive for headaches. Negative for dizziness.   Psychiatric/Behavioral:  Negative for depression.    All other systems reviewed and are negative.      Medications: Semaglutide(0.25 or 0.5MG/DOS) Sopn     Allergies: Pcn [penicillins]    Problem List: does not have any pertinent problems on file.    Surgical History:  No past surgical history on file.    Past Social Hx:   reports that he has never smoked. He has never been  exposed to tobacco smoke. He quit smokeless tobacco use about 3 years ago.  His smokeless tobacco use included chew. He reports current alcohol use of about 1.2 oz of alcohol per week. He reports that he does not currently use drugs after having used the following drugs: Marijuana.     Past Family Hx:   family history is not on file.     Problem list, medications, and allergies reviewed by myself today in Epic.   I have documented what I find to be significant in regards to past medical, social, family and surgical history  in my HPI or under PMH/PSH/FH review section, otherwise it is noncontributory     Objective:     /88   Pulse 90   Temp 36.5 °C (97.7 °F) (Temporal)   Resp 16   Ht 1.829 m (6')   Wt (!) 128 kg (281 lb 12.8 oz)   SpO2 98%   BMI 38.22 kg/m²     During this visit, appropriate PPE was worn, and hand hygiene was performed.    Physical Exam  Vitals reviewed.   Constitutional:       General: He is not in acute distress.     Appearance: Normal appearance. He is not ill-appearing, toxic-appearing or diaphoretic.   HENT:      Head: Normocephalic and atraumatic.      Right Ear: Tympanic membrane, ear canal and external ear normal. There is no impacted cerumen.      Left Ear: Tympanic membrane, ear canal and external ear normal. There is no impacted cerumen.      Nose: Congestion and rhinorrhea present. Rhinorrhea is purulent.      Right Turbinates: Swollen.      Left Turbinates: Swollen.      Right Sinus: Maxillary sinus tenderness and frontal sinus tenderness present.      Left Sinus: Maxillary sinus tenderness and frontal sinus tenderness present.      Mouth/Throat:      Mouth: Mucous membranes are moist.      Pharynx: No oropharyngeal exudate or posterior oropharyngeal erythema.      Comments: No tonsillar swelling, bilaterally.  No soft tissue swelling of the sublingual mucosa, no petechia or swelling of the soft or hard palate, no unilarteral oropharynx swelling, no sign of tonsillar stone,  epiglottitis, or abscess.  Airway is patent and there is no stridor.  Patient is managing oral secretions appropriately.  Uvula is midline and appropriate size with no erythema or edema.    Eyes:      General: No scleral icterus.        Right eye: No discharge.         Left eye: No discharge.      Extraocular Movements: Extraocular movements intact.      Conjunctiva/sclera: Conjunctivae normal.      Pupils: Pupils are equal, round, and reactive to light.   Cardiovascular:      Rate and Rhythm: Normal rate and regular rhythm.      Heart sounds: Normal heart sounds.   Pulmonary:      Effort: No respiratory distress.      Breath sounds: Normal breath sounds. No stridor. No wheezing, rhonchi or rales.   Chest:      Chest wall: No tenderness.   Abdominal:      General: There is no distension.      Palpations: Abdomen is soft.   Genitourinary:     Comments: Deferred  Musculoskeletal:         General: Normal range of motion.      Cervical back: Normal range of motion. No rigidity or tenderness.   Lymphadenopathy:      Cervical: No cervical adenopathy.   Skin:     General: Skin is warm and dry.   Neurological:      General: No focal deficit present.      Mental Status: He is alert and oriented to person, place, and time. Mental status is at baseline.   Psychiatric:         Mood and Affect: Mood normal.         Behavior: Behavior normal.         Assessment/Plan:     Diagnosis and associated orders:     1. Acute bacterial sinusitis  doxycycline (VIBRAMYCIN) 100 MG Tab    predniSONE (DELTASONE) 10 MG Tab    fluticasone (FLONASE) 50 MCG/ACT nasal spray    cetirizine (ZYRTEC ALLERGY) 10 MG Tab         Comments/MDM:     1. Acute bacterial sinusitis (Primary)  Patient meets Infectious Disease Society of [place] (IDSA)  guidelines for ABRS given duration of symptoms, worsening severity, clinical history and physical exam   We discussed management of sinus infection including -  - supportive care measures such as drinking plenty of  fluids, use a humidifier in room at night to keep mucous membranes moist, applying warm compresses to face and forehead may be useful in relieving sinus pain and pressure, using a sinus wash such as the [employer]i bottle several times a day as needed, Flonase daily, OTC second-generation non-sedating antihistamine such as Zyrtec, Allegra, or Loratadine daily, alternate Tylenol with ibuprofen (unless contraindicated) for pain and fever.  OTC decongestant of choice. Follow manufacturers guidelines for dosing and safety precautions.   -We did discuss red flags and reasons to return to urgent care versus when to go to the ER.    Discussed DDx, management options (risks,benefits, and alternatives to planned treatment), natural progression.  Questions were encouraged and answered. Written information was provided and I did go over this with the patient in clinic today.  Instructed patient regarding red flags and to return to urgent care prn if new or worsening sx or if there is no improvement in condition prn.    Advised the patient to follow-up with the primary care physician for recheck, reevaluation, and consideration of further management.  I did instruct patient regarding medications prescribed, purpose, side effects, cautions.  Instructed patient to get a pharmacy consult when picking up any prescribed medications.  Strict ER precautions discussed for any mastoid pain, swelling of the face or around the eyes, visual disturbances, eye pain, chest pain, difficulty breathing, difficulty swallowing, wheezing, stridor, or drooling, fever that does not respond to ibuprofen and Tylenol, inability to tolerate fluids, dehydration, especially in the setting of fever, chills, nausea or vomiting, lethargy.     Patient states they have good understanding and they are agreeable with the plan of care.     - doxycycline (VIBRAMYCIN) 100 MG Tab; Take 1 Tablet by mouth 2 times a day for 7 days.  Dispense: 14 Tablet; Refill: 0  -  predniSONE (DELTASONE) 10 MG Tab; Take 2 Tablets by mouth every day for 3 days.  Dispense: 6 Tablet; Refill: 0  - fluticasone (FLONASE) 50 MCG/ACT nasal spray; Administer 1 Spray into affected nostril(S) every day.  Dispense: 16 g; Refill: 0  - cetirizine (ZYRTEC ALLERGY) 10 MG Tab; Take 1 Tablet by mouth every day.  Dispense: 30 Tablet; Refill: 0         Pt is clinically stable at today's acute urgent care visit. Vital signs are normal and reassuring.  No acute distress noted. Appropriate for outpatient management at this time.        I personally reviewed prior external notes and test results pertinent to today's visit.  I have independently reviewed and interpreted all diagnostics ordered during this urgent care acute visit.        Please note that this dictation was created using voice recognition software. I have made a reasonable attempt to correct obvious errors, but I expect that there are errors of grammar and possibly content that I did not discover before finalizing the note.    This note was electronically signed by Rohith MANCILLA, SHAI, SANDIE, THOM